# Patient Record
Sex: FEMALE | NOT HISPANIC OR LATINO | Employment: FULL TIME | URBAN - METROPOLITAN AREA
[De-identification: names, ages, dates, MRNs, and addresses within clinical notes are randomized per-mention and may not be internally consistent; named-entity substitution may affect disease eponyms.]

---

## 2017-12-01 ENCOUNTER — TRANSFERRED RECORDS (OUTPATIENT)
Dept: HEALTH INFORMATION MANAGEMENT | Facility: CLINIC | Age: 27
End: 2017-12-01

## 2017-12-01 LAB — PAP SMEAR - HIM PATIENT REPORTED: NEGATIVE

## 2018-08-24 ENCOUNTER — OFFICE VISIT (OUTPATIENT)
Dept: FAMILY MEDICINE | Facility: CLINIC | Age: 28
End: 2018-08-24
Payer: COMMERCIAL

## 2018-08-24 VITALS
HEIGHT: 67 IN | TEMPERATURE: 98.1 F | DIASTOLIC BLOOD PRESSURE: 84 MMHG | SYSTOLIC BLOOD PRESSURE: 126 MMHG | RESPIRATION RATE: 18 BRPM | HEART RATE: 79 BPM | BODY MASS INDEX: 44.89 KG/M2 | OXYGEN SATURATION: 99 % | WEIGHT: 286 LBS

## 2018-08-24 DIAGNOSIS — J06.9 VIRAL UPPER RESPIRATORY TRACT INFECTION: Primary | ICD-10-CM

## 2018-08-24 DIAGNOSIS — E66.01 MORBID OBESITY (H): ICD-10-CM

## 2018-08-24 DIAGNOSIS — J98.01 ACUTE BRONCHOSPASM: ICD-10-CM

## 2018-08-24 PROCEDURE — 99203 OFFICE O/P NEW LOW 30 MIN: CPT | Performed by: FAMILY MEDICINE

## 2018-08-24 RX ORDER — PREDNISONE 20 MG/1
40 TABLET ORAL DAILY
Qty: 10 TABLET | Refills: 0 | Status: SHIPPED | OUTPATIENT
Start: 2018-08-24 | End: 2018-08-28

## 2018-08-24 RX ORDER — ALBUTEROL SULFATE 90 UG/1
2 AEROSOL, METERED RESPIRATORY (INHALATION) EVERY 6 HOURS PRN
Qty: 1 INHALER | Refills: 0 | Status: SHIPPED | OUTPATIENT
Start: 2018-08-24 | End: 2024-01-18

## 2018-08-24 NOTE — PROGRESS NOTES
"  SUBJECTIVE:   Bettina Blackman is a 28 year old female who presents to clinic today for the following health issues:        RESPIRATORY SYMPTOMS      Duration: X7 days,    Description  cough, wheezing and chest tightness    Severity: moderate    Accompanying signs and symptoms: None    History (predisposing factors):  none    Precipitating or alleviating factors: None    Therapies tried and outcome:  rest and fluids guaifenesin    No fevers.  No allergies.  No history of tobacco abuse or asthma.   [place]  No sick exposure.     Problem list and histories reviewed & adjusted, as indicated.  Additional history: as documented    Labs reviewed in EPIC    Reviewed and updated as needed this visit by clinical staff  Tobacco  Allergies  Meds  Problems  Med Hx  Surg Hx  Fam Hx  Soc Hx        Reviewed and updated as needed this visit by Provider  Allergies  Meds  Problems         ROS:  C: NEGATIVE for fever, chills, change in weight  I: NEGATIVE for worrisome rashes, moles or lesions  E: NEGATIVE for vision changes or irritation  CV: NEGATIVE for chest pain, palpitations or peripheral edema  GI: NEGATIVE for nausea, abdominal pain, heartburn, or change in bowel habits  M: NEGATIVE for significant arthralgias or myalgia  H: NEGATIVE for bleeding problems      OBJECTIVE:     /84 (BP Location: Left arm, Patient Position: Sitting, Cuff Size: Adult Large)  Pulse 79  Temp 98.1  F (36.7  C) (Tympanic)  Resp 18  Ht 5' 7\" (1.702 m)  Wt 286 lb (129.7 kg)  LMP 07/18/2018  SpO2 99%  Breastfeeding? No  BMI 44.79 kg/m2  Body mass index is 44.79 kg/(m^2).   GENERAL: alert and coughing/wheezing intermittently   EYES: Eyes grossly normal to inspection, PERRL and conjunctivae and sclerae normal  HENT: ear canals and TM's normal, mouth without ulcers or lesions.  +b/l boggy turbinates, no active nasal drainage.  NECK: no adenopathy, no asymmetry, masses, or scars and thyroid " normal to palpation  RESP: No rales on auscultation.  +rhonchi and audible /b/l expiratory wheezes  CV: regular rate and rhythm, normal S1 S2, no S3 or S4, no murmur, click or rub, no peripheral edema and peripheral pulses strong  SKIN: no suspicious lesions or rashes      Diagnostic Test Results:  none     ASSESSMENT/PLAN:     Problem List Items Addressed This Visit     Morbid obesity (H)    Relevant Medications    metFORMIN (GLUCOPHAGE) 500 MG tablet      Other Visit Diagnoses     Viral upper respiratory tract infection    -  Primary    Relevant Medications    albuterol (PROAIR HFA/PROVENTIL HFA/VENTOLIN HFA) 108 (90 Base) MCG/ACT inhaler    predniSONE (DELTASONE) 20 MG tablet    Acute bronchospasm             --push fluids, rest, and symptomatic treatment as needed:  Mucinex 600 mg 2 tabs bid  Increase humidity to 30-40% in bedroom at night - vaporizer  Saline nasal spray as needed  Benadryl 25mg 1/2 - 1 hour before bed time  Maintain 8 hr minimum of sleep at night  Robitussin for cough  --Will return to clinic in 1 week or sooner if needed.  See Patient Instructions for details and follow-up instructions  --Will work on diet/exercise and weight loss when feeling better. Admits that she is currently taking Metformin but may re-start it later.     Frances Cronin, DO  [facility]

## 2018-08-24 NOTE — PATIENT INSTRUCTIONS
Proair HFA Inhaler  Medicine: Albuterol (al-BYOO-ter-ahl)  What it does  Works quickly to relax your airways and make breathing easier. The medicine usually lasts 3 to 4 hours. Use when you need fast relief.  Test spray (priming)  Prime inhaler before first use or if not used for 2 weeks or more. Shake the inhaler and spray 3 times away from face.  How to use this inhaler  1. Wash and dry your hands well.  2. Remove the mouthpiece cover. Check for loose parts inside the mouthpiece.  3. Sit up straight or stand up.  4. Shake inhaler well before each spray.  5. Fully exhale (breathe out) through mouth. Hold the inhaler so the mouthpiece is at the bottom and the canister is sticking straight up.  6. Place the mouthpiece between your lips. Make sure that your tongue is flat under the mouthpiece and does not block the opening.  7. Seal your lips around the mouthpiece.  8. Push the canister all the way down as you slowly take a deep breath through your mouth for 5 seconds.    9. Hold your breath for 10 seconds. If you cannot hold your breath for 10 seconds, then hold it for as long as you can.  10. If you need another dose, wait one minute and repeat steps 4 to 9.  11. Replace the cover after each use. Store in a cool, dry place.  Cleaning  Wash one time each week. Remove canister and wash mouthpiece with warm water. Do not get the canister wet. Let air-dry overnight. Put inhaler back together and spray two times.  If not cleaned, the inhaler may not work.  How to tell if the inhaler is empty  Each inhaler contains 200 puffs. The inhaler is empty when the dose counter reads 0. The numbers will turn red when you have 20 doses left to remind you to get a refill.  If you have questions about the use of your inhaler, please ask your pharmacist or provider.  For more information and video demos, go to [email or web address].  For informational purposes only. Not to replace the advice of your health care provider.  Copyright    2013 [facility]. All rights reserved. [employer] 975410   REV 02/16.    Viral or Bacterial Bronchitis with Wheezing (Adult)    Bronchitis is an infection of the air passages. It often occurs during a cold and is usually caused by a virus. Symptoms include cough with mucus (phlegm) and low-grade fever. This illness is contagious during the first few days and is spread through the air by coughing and sneezing, or by direct contact (touching the sick person and then touching your own eyes, nose, or mouth).  If there is a lot of inflammation, air flow is restricted. The air passages may also go into spasm, especially if you have asthma. This causes wheezing and difficulty breathing even in people who do not have asthma.  Bronchitis usually lasts 7 to 14 days. The wheezing should improve with treatment during the first week. An inhaler is often prescribed to relax the air passages and stop wheezing. Antibiotics will be prescribed if your doctor thinks there is also a secondary bacterial infection.  Home care    If symptoms are severe, rest at home for the first 2 to 3 days. When you go back to your usual activities, don't let yourself get too tired.    Do not smoke. Also avoid being exposed to secondhand smoke.    You may use over-the-counter medicine to control fever or pain, unless another medicine was prescribed. Note: If you have chronic liver or kidney disease or have ever had a stomach ulcer or gastrointestinal bleeding, talk with your healthcare provider before using these medicines. Also talk to your provider if you are taking medicine to prevent blood clots.) Aspirin should never be given to anyone younger than 18 years of age who is ill with a viral infection or fever. It may cause severe liver or brain damage.    Your appetite may be poor, so a light diet is fine. Avoid dehydration by drinking 6 to 8 glasses of fluids per day (such as water, soft drinks, sports drinks, juices, tea, or soup).  Extra fluids will help loosen secretions in the nose and lungs.    Over-the-counter cough, cold, and sore-throat medicines will not shorten the length of the illness, but they may be helpful to reduce symptoms. (Note: Do not use decongestants if you have high blood pressure.)    If you were given an inhaler, use it exactly as directed. If you need to use it more often than prescribed, your condition may be worsening. If this happens, contact your healthcare provider.    If prescribed, finish all antibiotic medicine, even if you are feeling better after only a few days.  Follow-up care  Follow up with your healthcare provider, or as advised. If you had an X-ray or ECG (electrocardiogram), a specialist will review it. You will be notified of any new findings that may affect your care.  If you are age 65 or older, or if you have a chronic lung disease or condition that affects your immune system, or you smoke, ask your healthcare provider about getting a pneumococcal vaccine and a yearly flu shot (influenza vaccine).  When to seek medical advice  Call your healthcare provider right away if any of these occur:    Fever of 100.4 F (38 C) or higher, or as directed by your healthcare provider    Coughing up increasing amounts of colored sputum    Weakness, drowsiness, headache, facial pain, ear pain, or a stiff neck  Call 911  Call 911 if any of these occur.    Coughing up blood    Worsening weakness, drowsiness, headache, or stiff neck    Increased wheezing not helped with medication, shortness of breath, or pain with breathing  Date Last Reviewed: 9/13/2015 2000-2017 [employer]. [address] [place] [address], [place] [address]. All rights reserved. This information is not intended as a substitute for professional medical care. Always follow your healthcare professional's instructions.

## 2018-08-24 NOTE — Clinical Note
Please abstract the following data from this visit with this patient into the appropriate field in Epic:  Pap smear done on this date: 12/2017 (approximately), by this group: [facility], results were NEGATIVE. Next Pap due in 3 years

## 2018-08-24 NOTE — MR AVS SNAPSHOT
After Visit Summary   8/24/2018    Bettina Blackman    MRN: [id number]           Patient Information     Date Of Birth          1990        Visit Information        Provider Department      8/24/2018 3:10 PM Frances Cronin DO [facility]        Today's Diagnoses     Viral upper respiratory tract infection    -  1      Care Instructions    Proair HFA Inhaler  Medicine: Albuterol (al-BYOO-ter-ahl)  What it does  Works quickly to relax your airways and make breathing easier. The medicine usually lasts 3 to 4 hours. Use when you need fast relief.  Test spray (priming)  Prime inhaler before first use or if not used for 2 weeks or more. Shake the inhaler and spray 3 times away from face.  How to use this inhaler  1. Wash and dry your hands well.  2. Remove the mouthpiece cover. Check for loose parts inside the mouthpiece.  3. Sit up straight or stand up.  4. Shake inhaler well before each spray.  5. Fully exhale (breathe out) through mouth. Hold the inhaler so the mouthpiece is at the bottom and the canister is sticking straight up.  6. Place the mouthpiece between your lips. Make sure that your tongue is flat under the mouthpiece and does not block the opening.  7. Seal your lips around the mouthpiece.  8. Push the canister all the way down as you slowly take a deep breath through your mouth for 5 seconds.    9. Hold your breath for 10 seconds. If you cannot hold your breath for 10 seconds, then hold it for as long as you can.  10. If you need another dose, wait one minute and repeat steps 4 to 9.  11. Replace the cover after each use. Store in a cool, dry place.  Cleaning  Wash one time each week. Remove canister and wash mouthpiece with warm water. Do not get the canister wet. Let air-dry overnight. Put inhaler back together and spray two times.  If not cleaned, the inhaler may not work.  How to tell if the inhaler is empty  Each inhaler contains 200 puffs.  The inhaler is empty when the dose counter reads 0. The numbers will turn red when you have 20 doses left to remind you to get a refill.  If you have questions about the use of your inhaler, please ask your pharmacist or provider.  For more information and video demos, go to [email or web address].  For informational purposes only. Not to replace the advice of your health care provider.  Copyright   2013 [facility]. All rights reserved. [employer] [id number] - REV 02/16.    Viral or Bacterial Bronchitis with Wheezing (Adult)    Bronchitis is an infection of the air passages. It often occurs during a cold and is usually caused by a virus. Symptoms include cough with mucus (phlegm) and low-grade fever. This illness is contagious during the first few days and is spread through the air by coughing and sneezing, or by direct contact (touching the sick person and then touching your own eyes, nose, or mouth).  If there is a lot of inflammation, air flow is restricted. The air passages may also go into spasm, especially if you have asthma. This causes wheezing and difficulty breathing even in people who do not have asthma.  Bronchitis usually lasts 7 to 14 days. The wheezing should improve with treatment during the first week. An inhaler is often prescribed to relax the air passages and stop wheezing. Antibiotics will be prescribed if your doctor thinks there is also a secondary bacterial infection.  Home care    If symptoms are severe, rest at home for the first 2 to 3 days. When you go back to your usual activities, don't let yourself get too tired.    Do not smoke. Also avoid being exposed to secondhand smoke.    You may use over-the-counter medicine to control fever or pain, unless another medicine was prescribed. Note: If you have chronic liver or kidney disease or have ever had a stomach ulcer or gastrointestinal bleeding, talk with your healthcare provider before using these medicines. Also talk to  your provider if you are taking medicine to prevent blood clots.) Aspirin should never be given to anyone younger than 18 years of age who is ill with a viral infection or fever. It may cause severe liver or brain damage.    Your appetite may be poor, so a light diet is fine. Avoid dehydration by drinking 6 to 8 glasses of fluids per day (such as water, soft drinks, sports drinks, juices, tea, or soup). Extra fluids will help loosen secretions in the nose and lungs.    Over-the-counter cough, cold, and sore-throat medicines will not shorten the length of the illness, but they may be helpful to reduce symptoms. (Note: Do not use decongestants if you have high blood pressure.)    If you were given an inhaler, use it exactly as directed. If you need to use it more often than prescribed, your condition may be worsening. If this happens, contact your healthcare provider.    If prescribed, finish all antibiotic medicine, even if you are feeling better after only a few days.  Follow-up care  Follow up with your healthcare provider, or as advised. If you had an X-ray or ECG (electrocardiogram), a specialist will review it. You will be notified of any new findings that may affect your care.  If you are age 65 or older, or if you have a chronic lung disease or condition that affects your immune system, or you smoke, ask your healthcare provider about getting a pneumococcal vaccine and a yearly flu shot (influenza vaccine).  When to seek medical advice  Call your healthcare provider right away if any of these occur:    Fever of 100.4 F (38 C) or higher, or as directed by your healthcare provider    Coughing up increasing amounts of colored sputum    Weakness, drowsiness, headache, facial pain, ear pain, or a stiff neck  Call 911  Call 911 if any of these occur.    Coughing up blood    Worsening weakness, drowsiness, headache, or stiff neck    Increased wheezing not helped with medication, shortness of breath, or pain with  "breathing  Date Last Reviewed: 9/13/2015 2000-2017 [employer]. [address] [place] [address], [place] [address]. All rights reserved. This information is not intended as a substitute for professional medical care. Always follow your healthcare professional's instructions.                Follow-ups after your visit        Follow-up notes from your care team     Return in about 1 week (around 8/31/2018) for URI.      Who to contact     If you have questions or need follow up information about today's clinic visit or your schedule please contact [facility] directly at [phone].  Normal or non-critical lab and imaging results will be communicated to you by MyChart, letter or phone within 4 business days after the clinic has received the results. If you do not hear from us within 7 days, please contact the clinic through MyChart or phone. If you have a critical or abnormal lab result, we will notify you by phone as soon as possible.  Submit refill requests through [employer] or call your pharmacy and they will forward the refill request to us. Please allow 3 business days for your refill to be completed.          Additional Information About Your Visit        Care EveryWhere ID     This is your Care EveryWhere ID. This could be used by other organizations to access your [facility] medical records  [id number]        Your Vitals Were     Pulse Temperature Respirations Height Last Period Pulse Oximetry    79 98.1  F (36.7  C) (Tympanic) 18 5' 7\" (1.702 m) 07/18/2018 99%    Breastfeeding? BMI (Body Mass Index)                No 44.79 kg/m2           Blood Pressure from Last 3 Encounters:   08/24/18 126/84   08/15/14 112/62   04/01/14 100/58    Weight from Last 3 Encounters:   08/24/18 286 lb (129.7 kg)   04/01/14 267 lb 8 oz (121.3 kg)   01/25/14 271 lb (122.9 kg)              Today, you had the following     No orders found for display         Today's Medication Changes        "   These changes are accurate as of 8/24/18  3:33 PM.  If you have any questions, ask your nurse or doctor.               Start taking these medicines.        Dose/Directions    albuterol 108 (90 Base) MCG/ACT inhaler   Commonly known as:  PROAIR HFA/PROVENTIL HFA/VENTOLIN HFA   Used for:  Viral upper respiratory tract infection   Started by:  Frances Cronin DO        Dose:  2 puff   Inhale 2 puffs into the lungs every 6 hours as needed for shortness of breath / dyspnea or wheezing   Quantity:  1 Inhaler   Refills:  0       predniSONE 20 MG tablet   Commonly known as:  DELTASONE   Used for:  Viral upper respiratory tract infection   Started by:  Frances Cronin DO        Dose:  40 mg   Take 2 tablets (40 mg) by mouth daily for 5 days   Quantity:  10 tablet   Refills:  0            Where to get your medicines      These medications were sent to [facility] [address] [place] [address] - [address] AT [facility] [place]  [address], [facility] [address]     Phone:  [phone]     albuterol 108 (90 Base) MCG/ACT inhaler    predniSONE 20 MG tablet                Primary Care Provider    Sulma Ceja MD       No address on file        Equal Access to Services     RENETTA ZARATE : Hadii bridget ku hadasho Soomaali, waaxda luqadaha, qaybta kaalmada adeegyada, waxay florencia medley. So [facility] [phone].    ATENCIÓN: Si habla español, tiene a alvarez disposición servicios gratuitos de asistencia lingüística. Llame al [phone].    We comply with applicable federal civil rights laws and [place] laws. We do not discriminate on the basis of race, color, national origin, age, disability, sex, sexual orientation, or gender identity.            Thank you!     Thank you for choosing [facility]  for your care. Our goal is always to provide you with excellent care. Hearing back from our patients is one way we can continue  to improve our services. Please take a few minutes to complete the written survey that you may receive in the mail after your visit with us. Thank you!             Your Updated Medication List - Protect others around you: Learn how to safely use, store and throw away your medicines at www.disposemymeds.org.          This list is accurate as of 8/24/18  3:33 PM.  Always use your most recent med list.                   Brand Name Dispense Instructions for use Diagnosis    albuterol 108 (90 Base) MCG/ACT inhaler    PROAIR HFA/PROVENTIL HFA/VENTOLIN HFA    1 Inhaler    Inhale 2 puffs into the lungs every 6 hours as needed for shortness of breath / dyspnea or wheezing    Viral upper respiratory tract infection       FINACEA 15 % gel   Generic drug:  Azelaic Acid      Apply 0.5 inches topically.        metFORMIN 500 MG tablet    GLUCOPHAGE          predniSONE 20 MG tablet    DELTASONE    10 tablet    Take 2 tablets (40 mg) by mouth daily for 5 days    Viral upper respiratory tract infection

## 2018-08-28 ENCOUNTER — OFFICE VISIT (OUTPATIENT)
Dept: FAMILY MEDICINE | Facility: CLINIC | Age: 28
End: 2018-08-28
Payer: COMMERCIAL

## 2018-08-28 VITALS
SYSTOLIC BLOOD PRESSURE: 118 MMHG | TEMPERATURE: 98.2 F | WEIGHT: 285 LBS | BODY MASS INDEX: 44.64 KG/M2 | RESPIRATION RATE: 18 BRPM | DIASTOLIC BLOOD PRESSURE: 72 MMHG | HEART RATE: 87 BPM | OXYGEN SATURATION: 100 %

## 2018-08-28 DIAGNOSIS — J01.90 ACUTE SINUSITIS WITH SYMPTOMS > 10 DAYS: ICD-10-CM

## 2018-08-28 DIAGNOSIS — R06.2 WHEEZING: ICD-10-CM

## 2018-08-28 DIAGNOSIS — J06.9 VIRAL UPPER RESPIRATORY TRACT INFECTION: Primary | ICD-10-CM

## 2018-08-28 PROCEDURE — 99213 OFFICE O/P EST LOW 20 MIN: CPT | Performed by: FAMILY MEDICINE

## 2018-08-28 RX ORDER — PREDNISONE 20 MG/1
TABLET ORAL
Qty: 20 TABLET | Refills: 0 | Status: SHIPPED | OUTPATIENT
Start: 2018-08-28 | End: 2019-09-19

## 2018-08-28 RX ORDER — AMOXICILLIN 500 MG/1
1000 CAPSULE ORAL 3 TIMES DAILY
Qty: 60 CAPSULE | Refills: 0 | Status: SHIPPED | OUTPATIENT
Start: 2018-08-28 | End: 2018-09-07

## 2018-08-28 NOTE — MR AVS SNAPSHOT
After Visit Summary   8/28/2018    Bettina Blackman    MRN: [id number]           Patient Information     Date Of Birth          1990        Visit Information        Provider Department      8/28/2018 7:15 AM Rob Bolden MD [facility]        Today's Diagnoses     Viral upper respiratory tract infection    -  1    Wheezing        Acute sinusitis with symptoms > 10 days          Care Instructions    Will cover with amoxicillin and prednisone. See back in 2 weeks if not improving.          Follow-ups after your visit        Follow-up notes from your care team     Return in about 2 weeks (around 9/11/2018), or if symptoms worsen or fail to improve, for Routine Visit.      Who to contact     If you have questions or need follow up information about today's clinic visit or your schedule please contact [facility] directly at [phone].  Normal or non-critical lab and imaging results will be communicated to you by MyChart, letter or phone within 4 business days after the clinic has received the results. If you do not hear from us within 7 days, please contact the clinic through MyChart or phone. If you have a critical or abnormal lab result, we will notify you by phone as soon as possible.  Submit refill requests through [employer] or call your pharmacy and they will forward the refill request to us. Please allow 3 business days for your refill to be completed.          Additional Information About Your Visit        Care EveryWhere ID     This is your Care EveryWhere ID. This could be used by other organizations to access your [facility] medical records  [id number]        Your Vitals Were     Pulse Temperature Respirations Pulse Oximetry BMI (Body Mass Index)       87 98.2  F (36.8  C) (Tympanic) 18 100% 44.64 kg/m2        Blood Pressure from Last 3 Encounters:   08/28/18 118/72   08/24/18 126/84   08/15/14 112/62    Weight from  Last 3 Encounters:   08/28/18 285 lb (129.3 kg)   08/24/18 286 lb (129.7 kg)   04/01/14 267 lb 8 oz (121.3 kg)              Today, you had the following     No orders found for display         Today's Medication Changes          These changes are accurate as of 8/28/18  7:42 AM.  If you have any questions, ask your nurse or doctor.               Start taking these medicines.        Dose/Directions    amoxicillin 500 MG capsule   Commonly known as:  AMOXIL   Used for:  Acute sinusitis with symptoms > 10 days   Started by:  Rob Bolden MD        Dose:  1000 mg   Take 2 capsules (1,000 mg) by mouth 3 times daily for 10 days   Quantity:  60 capsule   Refills:  0       predniSONE 20 MG tablet   Commonly known as:  DELTASONE   Used for:  Wheezing, Viral upper respiratory tract infection   Started by:  Rob Bolden MD        Take 3 tabs (60 mg) by mouth daily x 3 days, 2 tabs (40 mg) daily x 3 days, 1 tab (20 mg) daily x 3 days, then 1/2 tab (10 mg) x 3 days.   Quantity:  20 tablet   Refills:  0            Where to get your medicines      These medications were sent to [facility] [address] [place] [address] [address] AT [facility] [place]  [address], [facility] [address]     Phone:  [phone]     amoxicillin 500 MG capsule    predniSONE 20 MG tablet                Primary Care Provider    Sulma Ceja MD       No address on file        Equal Access to Services     COREY ZARATE AH: Hadii bridget groveo Sosheeba, waaxda luqadaha, qaybta kaalmada adeegyada, waxay florencia medley. So [facility] [phone].    ATENCIÓN: Si habla español, tiene a alvarez disposición servicios gratuitos de asistencia lingüística. Llame al [phone].    We comply with applicable federal civil rights laws and [place] laws. We do not discriminate on the basis of race, color, national origin, age, disability, sex, sexual orientation, or gender  identity.            Thank you!     Thank you for choosing [facility]  for your care. Our goal is always to provide you with excellent care. Hearing back from our patients is one way we can continue to improve our services. Please take a few minutes to complete the written survey that you may receive in the mail after your visit with us. Thank you!             Your Updated Medication List - Protect others around you: Learn how to safely use, store and throw away your medicines at www.disposemymeds.org.          This list is accurate as of 8/28/18  7:42 AM.  Always use your most recent med list.                   Brand Name Dispense Instructions for use Diagnosis    albuterol 108 (90 Base) MCG/ACT inhaler    PROAIR HFA/PROVENTIL HFA/VENTOLIN HFA    1 Inhaler    Inhale 2 puffs into the lungs every 6 hours as needed for shortness of breath / dyspnea or wheezing    Viral upper respiratory tract infection       amoxicillin 500 MG capsule    AMOXIL    60 capsule    Take 2 capsules (1,000 mg) by mouth 3 times daily for 10 days    Acute sinusitis with symptoms > 10 days       FINACEA 15 % gel   Generic drug:  Azelaic Acid      Apply 0.5 inches topically.        metFORMIN 500 MG tablet    GLUCOPHAGE          predniSONE 20 MG tablet    DELTASONE    20 tablet    Take 3 tabs (60 mg) by mouth daily x 3 days, 2 tabs (40 mg) daily x 3 days, 1 tab (20 mg) daily x 3 days, then 1/2 tab (10 mg) x 3 days.    Wheezing, Viral upper respiratory tract infection

## 2018-08-28 NOTE — PROGRESS NOTES
SUBJECTIVE:   Bettina Blackman is a 28 year old female who presents to clinic today for the following health issues:      ENT Symptoms             Symptoms: cc Present Absent Comment   Fever/Chills  x  Sweats, not actual fever   Fatigue  x     Muscle Aches  x     Eye Irritation   x    Sneezing   x    Nasal Hakan/Drg  x     Sinus Pressure/Pain   x    Loss of smell   x    Dental pain   x    Sore Throat   x    Swollen Glands   x    Ear Pain/Fullness   x    Cough  x     Wheeze  x     Chest Pain  x     Shortness of breath  x     Rash   x    Other   x      Symptom duration:  1-2 weeks   Symptom severity:  moderate   Treatments tried:  cough syrup-nyquil and dayquil, sudafed, mucinex, tyneol/ibuprofen, prednisone and inhaler   Contacts:  none             Problem list and histories reviewed & adjusted, as indicated.  Additional history: as documented    Patient Active Problem List   Diagnosis     Acne     Vitamin D deficiency     Cutaneous skin tags     Morbid obesity (H)     History reviewed. No pertinent surgical history.    Social History   Substance Use Topics     Smoking status: Never Smoker     Smokeless tobacco: Never Used     Alcohol use Yes     Family History   Problem Relation Age of Onset     Diabetes Father            Reviewed and updated as needed this visit by clinical staff  Tobacco  Allergies  Meds  Med Hx  Surg Hx  Fam Hx  Soc Hx      Reviewed and updated as needed this visit by Provider         ROS:  Constitutional, HEENT, cardiovascular, pulmonary, gi and gu systems are negative, except as otherwise noted.    OBJECTIVE:                                                    /72  Pulse 87  Temp 98.2  F (36.8  C) (Tympanic)  Resp 18  Wt 285 lb (129.3 kg)  SpO2 100%  BMI 44.64 kg/m2  Body mass index is 44.64 kg/(m^2).  GENERAL APPEARANCE: healthy, alert and no distress  EYES: Eyes grossly normal to inspection, PERRL and conjunctivae and sclerae normal  HENT: ear canals and TM's normal and  nose and mouth without ulcers or lesions  RESP: expiratory wheezes throughout  LYMPHATICS: no cervical adenopathy         ASSESSMENT/PLAN:                                                        ICD-10-CM    1. Viral upper respiratory tract infection J06.9 predniSONE (DELTASONE) 20 MG tablet    B97.89    2. Wheezing R06.2 predniSONE (DELTASONE) 20 MG tablet   3. Acute sinusitis with symptoms > 10 days J01.90 amoxicillin (AMOXIL) 500 MG capsule       Patient Instructions   Will cover with amoxicillin and prednisone. See back in 2 weeks if not improving.      Rob Bolden MD  [facility]

## 2019-09-19 ENCOUNTER — OFFICE VISIT (OUTPATIENT)
Dept: FAMILY MEDICINE | Facility: CLINIC | Age: 29
End: 2019-09-19
Payer: COMMERCIAL

## 2019-09-19 VITALS — HEART RATE: 75 BPM | DIASTOLIC BLOOD PRESSURE: 88 MMHG | SYSTOLIC BLOOD PRESSURE: 126 MMHG | TEMPERATURE: 98.1 F

## 2019-09-19 DIAGNOSIS — Z71.84 TRAVEL ADVICE ENCOUNTER: Primary | ICD-10-CM

## 2019-09-19 PROCEDURE — 36415 COLL VENOUS BLD VENIPUNCTURE: CPT | Mod: GA | Performed by: NURSE PRACTITIONER

## 2019-09-19 PROCEDURE — 99402 PREV MED CNSL INDIV APPRX 30: CPT | Mod: 25 | Performed by: NURSE PRACTITIONER

## 2019-09-19 PROCEDURE — 90472 IMMUNIZATION ADMIN EACH ADD: CPT | Mod: GA | Performed by: NURSE PRACTITIONER

## 2019-09-19 PROCEDURE — 86787 VARICELLA-ZOSTER ANTIBODY: CPT | Mod: GA | Performed by: NURSE PRACTITIONER

## 2019-09-19 PROCEDURE — 90691 TYPHOID VACCINE IM: CPT | Mod: GA | Performed by: NURSE PRACTITIONER

## 2019-09-19 PROCEDURE — 90715 TDAP VACCINE 7 YRS/> IM: CPT | Mod: GA | Performed by: NURSE PRACTITIONER

## 2019-09-19 PROCEDURE — 90717 YELLOW FEVER VACCINE SUBQ: CPT | Mod: GA | Performed by: NURSE PRACTITIONER

## 2019-09-19 PROCEDURE — 90471 IMMUNIZATION ADMIN: CPT | Performed by: NURSE PRACTITIONER

## 2019-09-19 PROCEDURE — 90686 IIV4 VACC NO PRSV 0.5 ML IM: CPT | Performed by: NURSE PRACTITIONER

## 2019-09-19 RX ORDER — ACETAZOLAMIDE 125 MG/1
TABLET ORAL
Qty: 20 TABLET | Refills: 0 | Status: SHIPPED | OUTPATIENT
Start: 2019-09-19 | End: 2024-01-18

## 2019-09-19 RX ORDER — AZITHROMYCIN 500 MG/1
500 TABLET, FILM COATED ORAL DAILY
Qty: 3 TABLET | Refills: 0 | Status: SHIPPED | OUTPATIENT
Start: 2019-09-19 | End: 2019-09-22

## 2019-09-19 NOTE — NURSING NOTE
Chief Complaint   Patient presents with     Travel Clinic   Prior to immunization administration, verified patients identity using patient s name and date of birth. Please see Immunization Activity for additional information.     Screening Questionnaire for Adult Immunization    Are you sick today?   No   Do you have allergies to medications, food, a vaccine component or latex?   No   Have you ever had a serious reaction after receiving a vaccination?   No   Do you have a long-term health problem with heart disease, lung disease, asthma, kidney disease, metabolic disease (e.g. diabetes), anemia, or other blood disorder?   No   Do you have cancer, leukemia, HIV/AIDS, or any other immune system problem?   No   In the past 3 months, have you taken medications that affect  your immune system, such as prednisone, other steroids, or anticancer drugs; drugs for the treatment of rheumatoid arthritis, Crohn s disease, or psoriasis; or have you had radiation treatments?   No   Have you had a seizure, or a brain or other nervous system problem?   No   During the past year, have you received a transfusion of blood or blood     products, or been given immune (gamma) globulin or antiviral drug?   No   For women: Are you pregnant or is there a chance you could become        pregnant during the next month?   No   Have you received any vaccinations in the past 4 weeks?   No     Immunization questionnaire answers were all negative.        Per orders of [place], injection of  Typhoid, TDap, Yellow Fever, Influenza  and  given by Coco Francois CMA. Patient instructed to remain in clinic for 15 minutes afterwards, and to report any adverse reaction to me immediately.       Screening performed by Coco Francois CMA on 9/19/2019 at 5:25 PM.

## 2019-09-19 NOTE — PROGRESS NOTES
Nurse Note      Itinerary:  [place] and [place]      Departure Date: 10/23/2019      Return Date: 11/03/2019      Length of Trip 3 weeks      Reason for Travel: Tourism           Urban or rural: both      Accommodations: Hotel        IMMUNIZATION HISTORY  Have you received any immunizations within the past 4 weeks?  No  Have you ever fainted from having your blood drawn or from an injection?  No  Have you ever had a fever reaction to vaccination?  No  Have you ever had any bad reaction or side effect from any vaccination?  No  Have you ever had hepatitis A or B vaccine?  Yes  Do you live (or work closely) with anyone who has AIDS, an AIDS-like condition, any other immune disorder or who is on chemotherapy for cancer?  No  Do you have a family history of immunodeficiency?  No  Have you received any injection of immune globulin or any blood products during the past 12 months?  No    Patient roomed by [facility] Francois Angélica Blackman is a 29 year old female seen today alone for counsultation for international travel to the stated countries.   Patient will be departing in  1 month(s) and  traveling with friends.      Patient itinerary :  [place] the [facility] and [place] region of [place] and [place] which presents risk for Rabies, food borne illnesses and Typhoid. exposure.      Patient's activities will include sightseeing, hiking and high altitude exposure.    Patient's country of birth is [place]    Special medical concerns: none  Pre-travel questionnaire was completed by patient and reviewed by provider.     Vitals: /88 (BP Location: Left arm, Patient Position: Sitting, Cuff Size: Adult Large)   Pulse 75   Temp 98.1  F (36.7  C) (Oral)   LMP 09/16/2019   BMI= There is no height or weight on file to calculate BMI.    EXAM:  General:  Well-nourished, well-developed in no acute distress.  Appears to be stated age, interacts appropriately and expresses understanding of  information given to patient.    Current Outpatient Medications   Medication Sig Dispense Refill     acetaZOLAMIDE (DIAMOX) 125 MG tablet Take one tab every 12 hours starting 24 hours prior to [place] and continue for 2 days may restart before [place] 20 tablet 0     albuterol (PROAIR HFA/PROVENTIL HFA/VENTOLIN HFA) 108 (90 Base) MCG/ACT inhaler Inhale 2 puffs into the lungs every 6 hours as needed for shortness of breath / dyspnea or wheezing 1 Inhaler 0     Azelaic Acid (FINACEA) 15 % gel Apply 0.5 inches topically.       Patient Active Problem List   Diagnosis     Acne     Vitamin D deficiency     Cutaneous skin tags     Morbid obesity (H)     Not on File      Immunizations discussed include:   Hepatitis A:  Up to date  Hepatitis B: Up to date  Influenza: Ordered/given today, risks, benefits and side effects reviewed  Typhoid: Ordered/given today, risks, benefits and side effects reviewed  Rabies: Declined  reviewed managment of a animal bite or scratch (washing wound, seek medical care within 24 hours for post exposure prophylaxis )  Yellow Fever:Stamaril given today , consent signed  Ivorian Encephalitis: Not indicated  Meningococcus: Not indicated  Tetanus/Diphtheria: Ordered/given today, risks, benefits and side effects reviewed  Measles/Mumps/Rubella: Up to date  Cholera: Not needed  Polio: Up to date  Pneumococcal: Under age of 65  Varicella: Immune by disease history per patient report  Zostavax:  Not indicated  Shingrix: Ordered/given today, risks, benefits and side effects reviewed  HPV: UP to date  TB:  Low risk     Stamaril Informed Consent    The patient was provided with a copy of the IRB-approved consent form and all questions were answered before the patient agreed to participate by signing the informed consent document.   A copy of the form was provided to the patient.    Date: 09/19/2019  Consent Version Date: 5/10/2017   Consent Obtained by:  Sammie Rodriguez CNP (Lori)     HIPAA:  Yes  HIPAA  Authorization Signed Date: 09/19/2019      Inclusion/Exclusion Criteria:    (Similar to Yellow Fever-VAX)      The patient met all of the following inclusion criteria in order to be eligible for the Stamaril vaccination under this EAP (Expanded Access Investigational New Drug Program)           At increased risk for YF, including researchers, laboratory workers, vaccine production staff, and those who are traveling within 30 days to a YF-endemic region or to a country requiring proof of YF vaccination under IHRs (International Health Regulations)?       Yes     Patient is greater than or equal to 9 months of age on the day of vaccination?     Yes     Patient is greater than or equal to 18 years of age and signed and dated the Consent Forms?     Yes     Patient is < 18 years of age and parent(s)/guardian(s) signed and dated the Consent Forms?      Patient is 7 years to < 18 years of age and signed and dated the Assent form?        No Assent is required.  Patient is <7 years of age.     No      No      N/A     The patient did not meet any of the following criteria that would have excluded the patient from receiving the Stamaril vaccination under this EAP              Patient is less than 9 months of age.       No     The patient is breast-feeding and cannot stop nursing for at least 14 days after vaccination.    Note: Yellow Fever vaccine virus may be transmissible via breast milk by nursing mothers who are vaccinated during the final 2 weeks of pregnancy or post-partum.   Following transmission, infants may develop encephalitis.  The minimum time of discontinuation of breastfeeding for 14 days after vaccination is based on the expected clearance of live-attenuated vaccine virus.       No     The patient is immunosuppressed, whether congenital or idiopathic, including for example, leukemia, lymphoma, other malignancies, and patients who are receiving immunosuppressant medications (e.g. Systemic corticosteroids  [greater than the standard dose of topical or inhaled steroids], alkylating drugs, antimetabolites, of other cytotoxic or immunomodulatory drugs) or radiation therapy or organ transplantation.       No     The patient has known hypersensitivity to the active substance or to any of the excipients of Stamaril vaccine or to eggs or chicken proteins.     No     The patient is symptomatic for human immunodeficiency virus (HIV) infection     No     The patient is asymptomatic for HIV infection but accompanied by evidence of severe immune suppression    Note:  Evidence of severe immune suppression includes CD4+ T-cell counts < 200 cubic millimeters (or < 15% total lymphocytes in children aged < 6 years), or as determined by the health care provider.       No     The patient has a history of thymus dysfunction (including myasthenia gravis, thymoma, thymectomy)     No     Moderate or severe febrile illness or acute illness    Note: Participation in the EAP can be reassessed when moderate or severe febrile illness or acute illness has resolved.       No               Altitude Exposure on this trip: Yes   Past tolerance to Altitude: has tolerated to about [phone]    ASSESSMENT/PLAN:    ICD-10-CM    1. Travel advice encounter Z71.89 Varicella Zoster Virus Antibody IgG     azithromycin (ZITHROMAX) 500 MG tablet     acetaZOLAMIDE (DIAMOX) 125 MG tablet     I have reviewed general recommendations for safe travel   including: food/water precautions, insect precautions, safer sex   practices given high prevalence of Zika, HIV and other STDs,   roadway safety. Educational materials and [facility] report provided.    Malaraia prophylaxis recommended: none  Symptomatic treatment for traveler's diarrhea: azithromycin  Altitude illness prevention and treatment: .diqamox        Evacuation insurance advised and resources were provided to patient.    Total visit time 30 minutes  with over 50% of time spent counseling patient as detailed  above.    Sammie Rodriguez CNP

## 2019-09-19 NOTE — PATIENT INSTRUCTIONS
Today September 19, 2019 you received the    Flu Vaccine    Yellow Fever (YF)    Tetanus (Tdap) Vaccine    Typhoid - injectable. This vaccine is valid for two years.   .    These appointments can be made as a NURSE ONLY visit.    **It is very important for the vaccinations to be given on the scheduled day(s), this helps ensure you receive the full effectiveness of the vaccine.**    Please call [facility] with any questions [phone]    Thank you for visiting [facility]

## 2019-09-20 LAB — VZV IGG SER QL IA: 5.1 AI (ref 0–0.8)

## 2019-11-08 ENCOUNTER — HEALTH MAINTENANCE LETTER (OUTPATIENT)
Age: 29
End: 2019-11-08

## 2020-12-06 ENCOUNTER — HEALTH MAINTENANCE LETTER (OUTPATIENT)
Age: 30
End: 2020-12-06

## 2021-02-20 ENCOUNTER — HEALTH MAINTENANCE LETTER (OUTPATIENT)
Age: 31
End: 2021-02-20

## 2021-04-27 ENCOUNTER — HOSPITAL ENCOUNTER (EMERGENCY)
Facility: CLINIC | Age: 31
Discharge: HOME | End: 2021-04-28
Attending: EMERGENCY MEDICINE | Admitting: EMERGENCY MEDICINE
Payer: COMMERCIAL

## 2021-04-27 DIAGNOSIS — R51.9 NONINTRACTABLE HEADACHE, UNSPECIFIED CHRONICITY PATTERN, UNSPECIFIED HEADACHE TYPE: ICD-10-CM

## 2021-04-27 LAB
ANION GAP SERPL CALCULATED.3IONS-SCNC: 2 MMOL/L (ref 3–14)
BASOPHILS # BLD AUTO: 0 10E9/L (ref 0–0.2)
BASOPHILS NFR BLD AUTO: 0.3 %
BUN SERPL-MCNC: 12 MG/DL (ref 7–30)
CALCIUM SERPL-MCNC: 8.6 MG/DL (ref 8.5–10.1)
CHLORIDE SERPL-SCNC: 109 MMOL/L (ref 94–109)
CO2 SERPL-SCNC: 26 MMOL/L (ref 20–32)
CREAT SERPL-MCNC: 0.83 MG/DL (ref 0.52–1.04)
DIFFERENTIAL METHOD BLD: NORMAL
EOSINOPHIL # BLD AUTO: 0.1 10E9/L (ref 0–0.7)
EOSINOPHIL NFR BLD AUTO: 2 %
ERYTHROCYTE [DISTWIDTH] IN BLOOD BY AUTOMATED COUNT: 12.4 % (ref 10–15)
GFR SERPL CREATININE-BSD FRML MDRD: >90 ML/MIN/{1.73_M2}
GLUCOSE SERPL-MCNC: 93 MG/DL (ref 70–99)
HCT VFR BLD AUTO: 38.7 % (ref 35–47)
HGB BLD-MCNC: 12.6 G/DL (ref 11.7–15.7)
IMM GRANULOCYTES # BLD: 0 10E9/L (ref 0–0.4)
IMM GRANULOCYTES NFR BLD: 0.3 %
LYMPHOCYTES # BLD AUTO: 3 10E9/L (ref 0.8–5.3)
LYMPHOCYTES NFR BLD AUTO: 42.8 %
MCH RBC QN AUTO: 28.5 PG (ref 26.5–33)
MCHC RBC AUTO-ENTMCNC: 32.6 G/DL (ref 31.5–36.5)
MCV RBC AUTO: 88 FL (ref 78–100)
MONOCYTES # BLD AUTO: 0.6 10E9/L (ref 0–1.3)
MONOCYTES NFR BLD AUTO: 8.2 %
NEUTROPHILS # BLD AUTO: 3.3 10E9/L (ref 1.6–8.3)
NEUTROPHILS NFR BLD AUTO: 46.4 %
NRBC # BLD AUTO: 0 10*3/UL
NRBC BLD AUTO-RTO: 0 /100
PLATELET # BLD AUTO: 304 10E9/L (ref 150–450)
POTASSIUM SERPL-SCNC: 3.8 MMOL/L (ref 3.4–5.3)
RBC # BLD AUTO: 4.42 10E12/L (ref 3.8–5.2)
SODIUM SERPL-SCNC: 137 MMOL/L (ref 133–144)
WBC # BLD AUTO: 7 10E9/L (ref 4–11)

## 2021-04-27 PROCEDURE — 85025 COMPLETE CBC W/AUTO DIFF WBC: CPT | Performed by: EMERGENCY MEDICINE

## 2021-04-27 PROCEDURE — 99285 EMERGENCY DEPT VISIT HI MDM: CPT | Mod: 25

## 2021-04-27 PROCEDURE — 96374 THER/PROPH/DIAG INJ IV PUSH: CPT

## 2021-04-27 PROCEDURE — 80048 BASIC METABOLIC PNL TOTAL CA: CPT | Performed by: EMERGENCY MEDICINE

## 2021-04-27 PROCEDURE — 250N000011 HC RX IP 250 OP 636: Performed by: EMERGENCY MEDICINE

## 2021-04-27 PROCEDURE — 96375 TX/PRO/DX INJ NEW DRUG ADDON: CPT

## 2021-04-27 RX ORDER — DEXAMETHASONE SODIUM PHOSPHATE 10 MG/ML
10 INJECTION, SOLUTION INTRAMUSCULAR; INTRAVENOUS ONCE
Status: COMPLETED | OUTPATIENT
Start: 2021-04-27 | End: 2021-04-27

## 2021-04-27 RX ORDER — LORAZEPAM 2 MG/ML
2 INJECTION INTRAMUSCULAR ONCE
Status: COMPLETED | OUTPATIENT
Start: 2021-04-27 | End: 2021-04-27

## 2021-04-27 RX ORDER — BUTALBITAL, ASPIRIN, AND CAFFEINE 325; 50; 40 MG/1; MG/1; MG/1
1 CAPSULE ORAL EVERY 4 HOURS PRN
Qty: 12 CAPSULE | Refills: 0 | Status: SHIPPED | OUTPATIENT
Start: 2021-04-27 | End: 2024-01-18

## 2021-04-27 RX ORDER — DIPHENHYDRAMINE HYDROCHLORIDE 50 MG/ML
50 INJECTION INTRAMUSCULAR; INTRAVENOUS ONCE
Status: COMPLETED | OUTPATIENT
Start: 2021-04-27 | End: 2021-04-27

## 2021-04-27 RX ADMIN — DIPHENHYDRAMINE HYDROCHLORIDE 50 MG: 50 INJECTION, SOLUTION INTRAMUSCULAR; INTRAVENOUS at 21:01

## 2021-04-27 RX ADMIN — LORAZEPAM 2 MG: 2 INJECTION INTRAMUSCULAR; INTRAVENOUS at 20:56

## 2021-04-27 RX ADMIN — PROCHLORPERAZINE EDISYLATE 10 MG: 5 INJECTION INTRAMUSCULAR; INTRAVENOUS at 20:58

## 2021-04-27 RX ADMIN — DEXAMETHASONE SODIUM PHOSPHATE 10 MG: 10 INJECTION, SOLUTION INTRAMUSCULAR; INTRAVENOUS at 20:59

## 2021-04-27 ASSESSMENT — MIFFLIN-ST. JEOR: SCORE: 2033.57

## 2021-04-27 ASSESSMENT — ENCOUNTER SYMPTOMS
NAUSEA: 1
NUMBNESS: 0
WEAKNESS: 0
HEADACHES: 1

## 2021-04-28 ENCOUNTER — APPOINTMENT (OUTPATIENT)
Dept: MRI IMAGING | Facility: CLINIC | Age: 31
End: 2021-04-28
Attending: EMERGENCY MEDICINE
Payer: COMMERCIAL

## 2021-04-28 VITALS
DIASTOLIC BLOOD PRESSURE: 80 MMHG | OXYGEN SATURATION: 97 % | RESPIRATION RATE: 20 BRPM | SYSTOLIC BLOOD PRESSURE: 150 MMHG | TEMPERATURE: 98 F | BODY MASS INDEX: 42.44 KG/M2 | HEART RATE: 81 BPM | HEIGHT: 68 IN | WEIGHT: 280 LBS

## 2021-04-28 PROCEDURE — 255N000002 HC RX 255 OP 636: Performed by: EMERGENCY MEDICINE

## 2021-04-28 PROCEDURE — 70546 MR ANGIOGRAPH HEAD W/O&W/DYE: CPT

## 2021-04-28 PROCEDURE — A9585 GADOBUTROL INJECTION: HCPCS | Performed by: EMERGENCY MEDICINE

## 2021-04-28 RX ORDER — GADOBUTROL 604.72 MG/ML
10 INJECTION INTRAVENOUS ONCE
Status: COMPLETED | OUTPATIENT
Start: 2021-04-28 | End: 2021-04-28

## 2021-04-28 RX ADMIN — GADOBUTROL 10 ML: 604.72 INJECTION INTRAVENOUS at 00:32

## 2021-04-28 NOTE — ED TRIAGE NOTES
Pt presents to ED c/o 7/10 Left sided HA that radiates to left eye since Sunday. Pt  Denies Hx of Migraines. Has been trying to manage pain with Tylenol and Advil without relief. Last dose of Tylenol (975 Mg) was at 4 pm today.

## 2021-04-28 NOTE — ED PROVIDER NOTES
"  History     Chief Complaint:  Headache       HPI  Bettina Blackman is a 30 year old female who presents for evaluation of a headache. The patient reports that on Sunday night she gradually developed a headache and since then has been worsening. The headache is found on the left side and radiates to her face. She has associated slight nausea with her headache. She has been taking ibuprofen and Tylenol for her symptoms. She denies having a headache similar to hers today. She denies diplopia, flashing lights, blurred vision, numbness, weakness, fever, fall or injury. She also denies a family history of migraines. Of note, she received the Fred and Fred vaccination on 4/11/21.    Review of Systems   Eyes: Negative for visual disturbance (no diplopia, flawshing lights, blurred vision).   Gastrointestinal: Positive for nausea.   Neurological: Positive for headaches. Negative for weakness and numbness.   All other systems reviewed and are negative.    Allergies:  No Known Allergies    Medications:   Diamox  Albuterol   Finacea     Medical History:   Acne  Vitamin D deficiency  Cutaneous skin tags  Morbid obesity     Family  History:    Father: diabetes    Social History:  The patient was unaccompanied to the ED.  Lives with roommates.   PCP: Sulma Ceja    Physical Exam     Patient Vitals for the past 24 hrs:   BP Temp Temp src Pulse Resp SpO2 Height Weight   04/27/21 2300 -- -- -- -- -- 97 % -- --   04/27/21 2230 -- -- -- -- -- 96 % -- --   04/27/21 2200 -- -- -- -- -- 97 % -- --   04/27/21 2130 -- -- -- -- -- 98 % -- --   04/27/21 2100 -- -- -- -- -- 100 % -- --   04/27/21 1944 -- 98  F (36.7  C) Temporal -- -- -- -- --   04/27/21 1943 (!) 150/80 -- -- 81 20 98 % 1.727 m (5' 8\") 127 kg (280 lb)      Physical Exam  Constitutional: Heavy set south  female. No respiratory distress.   HENT: No signs of trauma. No temporal artery tenderness.   Eyes: EOM are normal. Pupils are equal, round, and " reactive to light. Attempted fundoscopic exam difficulty secondary to patient's inability to focus.   Neck: Normal range of motion. No JVD present. No cervical adenopathy. No bruits.  Cardiovascular: Regular rhythm.  Exam reveals no gallop and no friction rub.    No murmur heard.  Pulmonary/Chest: Bilateral breath sounds normal. No wheezes, rhonchi or rales.  Abdominal: Soft. No tenderness. No rebound or guarding.   Musculoskeletal: No edema. No tenderness.   Lymphadenopathy: No lymphadenopathy.   Neurological: Alert and oriented to person, place, and time. Normal strength. Coordination normal. Fluent speech. No facial asymmetry. Normal sensation.    Skin: Skin is warm and dry. No rash noted. No erythema.     Emergency Department Course   Imaging:  MRV Brain wo & w Contrast: pending  Reading per radiology     Laboratory:  CBC: WBC 7.0, HGB 12.6,   BMP: anion gap 2 (Creatinine 0.83)    Emergency Department Course:  Reviewed:  2023 I reviewed nursing notes, vitals, past medical history and care everywhere    Interventions:  2056 Ativan 2 mg IV  2058 Compazine 10 mg IV  2059 Decadron 10 mg IV  2101 Benadryl 50 mg IV    Disposition:  The patient signed out to Dr. Foster pending MRV.  Impression & Plan   Medical Decision Making:  Bettina Blackman is a 30 year old female who presents with left sided headache present consistently for 2 days. She rarely if ever has headaches. This came on gradual onset, but has not gone away despite over the counter medications. She denies any visual problems, no flashing lights, no smell or taste abnormalities consistent with aura. She denies any focal numbness, weakness, fever, chills, or neck pain. She has had no trauma. She did received the Fred and Fred COVID vaccination approximately 2 and a half weeks ago. Her physical exam is relatively unremarkable. There are no focal neurological symptoms. Unfortunately she was unable to tolerate optic exam. Basic labs were okay  including normal platelet count. She received Compazine, Benadryl, and Decadron and that is helping. However, I did discuss with her the rare possibility after the Fred and Fred shot of clots and she is going to have a MRV scan tonight. Given the fact that the platelet count is normal, this is unlikely and this could be a tension or musculoskeletal headache or even some type of migraine. Patient has been signed out to the night physician Dr. Foster. Pending MRV report. If this negative I have written a prescription for Fiorinal and patient will be discharged home with referral to Dr. Hodgson for follow-up. If positive patient will require admission. I do not think this is meninginitis or acute bleed.     Diagnosis:     ICD-10-CM    1. Nonintractable headache, unspecified chronicity pattern, unspecified headache type  R51.9         Discharge Medications:  New Prescriptions    BUTALBITAL-ASPIRIN-CAFFEINE (FIORINAL) -40 MG CAPSULE    Take 1 capsule by mouth every 4 hours as needed for headaches       Scribe Disclosure:  I, Alyse Maza, am serving as a scribe at 8:23 PM on 4/27/2021 to document services personally performed by Anthony Isaac MD based on my observations and the provider's statements to me.     [facility] Anthony Escobedo MD  04/28/21 0020

## 2021-09-25 ENCOUNTER — HEALTH MAINTENANCE LETTER (OUTPATIENT)
Age: 31
End: 2021-09-25

## 2022-01-15 ENCOUNTER — HEALTH MAINTENANCE LETTER (OUTPATIENT)
Age: 32
End: 2022-01-15

## 2023-04-22 ENCOUNTER — HEALTH MAINTENANCE LETTER (OUTPATIENT)
Age: 33
End: 2023-04-22

## 2024-01-16 ENCOUNTER — NURSE TRIAGE (OUTPATIENT)
Dept: NURSING | Facility: CLINIC | Age: 34
End: 2024-01-16
Payer: COMMERCIAL

## 2024-01-16 NOTE — TELEPHONE ENCOUNTER
"Nurse Triage SBAR    Is this a 2nd Level Triage? YES, LICENSED PRACTITIONER REVIEW IS REQUIRED    Situation:  abdominal pain    Background:  patient is complaining of right-sided lower abdominal pain beginning yesterday.  Patient describes this pain as a constant ache and rates it at 5/10.  Patient states that it hurts worse when she is standing and it causes her to walk hunched over.  Last bowel movement was yesterday, patient denies fever    Assessment:  right-sided lower abdominal pain    Protocol Recommended Disposition:   See HCP Within 4 Hours (Or PCP Triage)    Recommendation:  patient is advised to go to . patient verbalized understanding of care advice    Merna Hernandez RN on 1/16/2024 at 5:09 PM      Does the patient meet one of the following criteria for ADS visit consideration? No      Reason for Disposition   [1] MILD-MODERATE pain AND [2] constant AND [3] present > 2 hours    Additional Information   Negative: Shock suspected (e.g., cold/pale/clammy skin, too weak to stand, low BP, rapid pulse)   Negative: Difficult to awaken or acting confused (e.g., disoriented, slurred speech)   Negative: Passed out (i.e., lost consciousness, collapsed and was not responding)   Negative: Sounds like a life-threatening emergency to the triager   Negative: [1] SEVERE pain (e.g., excruciating) AND [2] present > 1 hour   Negative: [1] SEVERE pain AND [2] age > 60 years   Negative: [1] Vomiting AND [2] contains red blood or black (\"coffee ground\") material  (Exception: Few red streaks in vomit that only happened once.)   Negative: Blood in bowel movements  (Exception: Blood on surface of BM with constipation.)   Negative: Black or tarry bowel movements  (Exception: Chronic-unchanged black-grey BMs AND is taking iron pills or Pepto-Bismol.)   Negative: [1] Vomiting AND [2] contains bile (green color)    Protocols used: Abdominal Pain - Female-A-AH    "

## 2024-01-17 ENCOUNTER — OFFICE VISIT (OUTPATIENT)
Dept: URGENT CARE | Facility: URGENT CARE | Age: 34
End: 2024-01-17
Payer: COMMERCIAL

## 2024-01-17 VITALS
OXYGEN SATURATION: 97 % | SYSTOLIC BLOOD PRESSURE: 145 MMHG | TEMPERATURE: 98.2 F | DIASTOLIC BLOOD PRESSURE: 94 MMHG | HEART RATE: 74 BPM

## 2024-01-17 DIAGNOSIS — K92.1 BLOODY STOOL: ICD-10-CM

## 2024-01-17 DIAGNOSIS — R10.31 RLQ ABDOMINAL PAIN: ICD-10-CM

## 2024-01-17 DIAGNOSIS — R10.2 PELVIC PAIN IN FEMALE: Primary | ICD-10-CM

## 2024-01-17 PROCEDURE — 99202 OFFICE O/P NEW SF 15 MIN: CPT | Performed by: EMERGENCY MEDICINE

## 2024-01-17 NOTE — PROGRESS NOTES
Assessment & Plan     Diagnosis:    ICD-10-CM    1. Pelvic pain in female  R10.2 Referral to Acute and Diagnostic Services (Day of diagnostic / First order acute)      2. RLQ abdominal pain  R10.31 Referral to Acute and Diagnostic Services (Day of diagnostic / First order acute)      3. Bloody stool  K92.1 Referral to Acute and Diagnostic Services (Day of diagnostic / First order acute)            Medical Decision Making:  Patient with history of ovarian cysts presented to urgent care with RLQ abdominal/pelvic pain. The clinical exam today is non-specific. The exact etiology of the abdominal pain is not clear at this time. The differential diagnosis of abdominal pain includes: Ovarian Cys, Appendicitis, Bowel Obstruction, Ulcer, Ischemia, kidney stone, Enteritis/Colitis,  amongst many other etiologies. Given limitations of urgent care evaluation and concern for ovarian vs intraabdominal etiology; I discussed being seen in the ADS tomorrow for further evaluation, imaging, blood work etc. as they are booked today. She has no fever, McBurney point tenderness, nausea or vomiting so while I have a lower suspicion for appendicitis I [facility] and she voices understanding and agreement with this plan.  Referral to Acute and Diagnostic Services    [phone] ([place]) [facility] [address], [address], [place] [address]    Transition to Acute & Diagnostic Services Clinic has been discussed with patient, and she agrees with next level of care.   Patient understands that evaluation/treatment at [facility] typically takes significantly longer than in clinic/urgent care (>2 hours).  The [facility] has been contacted by provider/staff to confirm patient acceptance.         Special issues:      None                     The following provider has assessed this patient for intervention at [facility], and directed the patient for referral: Aren Washburn,  SHAKA Washburn PA-C  [facility]    Subjective     Bettina Blackman is a 33 year old female who presents to clinic today for the following health issues:  Chief Complaint   Patient presents with    Abdominal Pain     Pt reports right lower stomach pain since Monday stating bloody stool this morning.       HPI  Patient with 3-4 days of RLQ abdominal/pelvic pain that has been waxing and waning in severity but overall getting progressively worse. She notes pain with movement and pushing on the area. Has hx of ovarian cysts and is unsure if this is the same pain she has had in the past. She also noticed one stool mixed with maroon colored this morning; has not had a lot of pain defecating, straining etc. No dysuria, hematuria, vaginal bleeding or discharge.  No nausea, vomiting, fevers.  Patient states that she is not sexually active; no concerns for pregnancy or STDs.      Review of Systems    See HPI    Objective      Vitals: BP (!) 145/94   Pulse 74   Temp 98.2  F (36.8  C) (Tympanic)   SpO2 97%   Resp: 16    Patient Vitals for the past 24 hrs:   BP Temp Temp src Pulse SpO2   01/17/24 1440 (!) 145/94 98.2  F (36.8  C) Tympanic 74 97 %       Vital signs reviewed by: Aren Washburn PA-C    Physical Exam   Constitutional: Patient is alert and cooperative. Mild acute distress.  Cardiovascular: Regular rate and rhythm  Pulmonary/Chest: Lungs are clear to auscultation throughout. Effort normal. No respiratory distress. No wheezes, rales or rhonchi.  GI: Abdomen with right suprapubic abdominal tenderness to palpation. No McBurney point tenderness. No CVA tenderness bilaterally.  Neurological: Alert and oriented x3.   Skin: No rash noted on visualized skin.  Psychiatric:The patient has a normal mood and affect.       Aren Washburn PA-C, January 17, 2024

## 2024-01-17 NOTE — LETTER
January 17, 2024      Bettina Blackman  [address]  [facility] [place] [address]        To Whom It May Concern:    Bettina Blackman  was seen on 1/17/2024.  Please excuse her from work until 1/19/2024 due to illness.        Sincerely,        Aren Washburn PA-C

## 2024-01-17 NOTE — PATIENT INSTRUCTIONS
No eating after midnight. Water is fine. Go to ER if worse, high fever, increased bloody stools or other concerns.

## 2024-01-18 ENCOUNTER — OFFICE VISIT (OUTPATIENT)
Dept: PEDIATRICS | Facility: CLINIC | Age: 34
End: 2024-01-18
Payer: COMMERCIAL

## 2024-01-18 ENCOUNTER — TELEPHONE (OUTPATIENT)
Dept: SURGERY | Facility: CLINIC | Age: 34
End: 2024-01-18

## 2024-01-18 ENCOUNTER — ANCILLARY PROCEDURE (OUTPATIENT)
Dept: CT IMAGING | Facility: CLINIC | Age: 34
End: 2024-01-18
Attending: PHYSICIAN ASSISTANT
Payer: COMMERCIAL

## 2024-01-18 VITALS
TEMPERATURE: 96.9 F | OXYGEN SATURATION: 99 % | WEIGHT: 293 LBS | BODY MASS INDEX: 44.55 KG/M2 | HEART RATE: 70 BPM | SYSTOLIC BLOOD PRESSURE: 144 MMHG | DIASTOLIC BLOOD PRESSURE: 90 MMHG

## 2024-01-18 DIAGNOSIS — R10.31 RLQ ABDOMINAL PAIN: ICD-10-CM

## 2024-01-18 DIAGNOSIS — K76.0 HEPATIC STEATOSIS: ICD-10-CM

## 2024-01-18 DIAGNOSIS — R10.31 RLQ ABDOMINAL PAIN: Primary | ICD-10-CM

## 2024-01-18 DIAGNOSIS — N83.201 CYST OF RIGHT OVARY: ICD-10-CM

## 2024-01-18 DIAGNOSIS — K92.1 HEMATOCHEZIA: ICD-10-CM

## 2024-01-18 LAB
ALBUMIN SERPL BCG-MCNC: 4.1 G/DL (ref 3.5–5.2)
ALBUMIN UR-MCNC: NEGATIVE MG/DL
ALP SERPL-CCNC: 64 U/L (ref 40–150)
ALT SERPL W P-5'-P-CCNC: 28 U/L (ref 0–50)
ANION GAP SERPL CALCULATED.3IONS-SCNC: 8 MMOL/L (ref 7–15)
APPEARANCE UR: CLEAR
AST SERPL W P-5'-P-CCNC: 23 U/L (ref 0–45)
BACTERIA #/AREA URNS HPF: ABNORMAL /HPF
BILIRUB SERPL-MCNC: 0.6 MG/DL
BILIRUB UR QL STRIP: NEGATIVE
BUN SERPL-MCNC: 8.9 MG/DL (ref 6–20)
CALCIUM SERPL-MCNC: 9 MG/DL (ref 8.6–10)
CHLORIDE SERPL-SCNC: 108 MMOL/L (ref 98–107)
COLOR UR AUTO: YELLOW
CREAT SERPL-MCNC: 0.84 MG/DL (ref 0.51–0.95)
DEPRECATED HCO3 PLAS-SCNC: 23 MMOL/L (ref 22–29)
EGFRCR SERPLBLD CKD-EPI 2021: >90 ML/MIN/1.73M2
ERYTHROCYTE [DISTWIDTH] IN BLOOD BY AUTOMATED COUNT: 12 % (ref 10–15)
GLUCOSE SERPL-MCNC: 92 MG/DL (ref 70–99)
GLUCOSE UR STRIP-MCNC: NEGATIVE MG/DL
HCG UR QL: NEGATIVE
HCT VFR BLD AUTO: 38.2 % (ref 35–47)
HGB BLD-MCNC: 12.4 G/DL (ref 11.7–15.7)
HGB UR QL STRIP: NEGATIVE
KETONES UR STRIP-MCNC: NEGATIVE MG/DL
LEUKOCYTE ESTERASE UR QL STRIP: ABNORMAL
MCH RBC QN AUTO: 28.4 PG (ref 26.5–33)
MCHC RBC AUTO-ENTMCNC: 32.5 G/DL (ref 31.5–36.5)
MCV RBC AUTO: 87 FL (ref 78–100)
NITRATE UR QL: NEGATIVE
PH UR STRIP: 7.5 [PH] (ref 5–7)
PLATELET # BLD AUTO: 248 10E3/UL (ref 150–450)
POTASSIUM SERPL-SCNC: 4.4 MMOL/L (ref 3.4–5.3)
PROT SERPL-MCNC: 7.2 G/DL (ref 6.4–8.3)
RBC # BLD AUTO: 4.37 10E6/UL (ref 3.8–5.2)
RBC #/AREA URNS AUTO: ABNORMAL /HPF
SODIUM SERPL-SCNC: 139 MMOL/L (ref 135–145)
SP GR UR STRIP: 1.02 (ref 1–1.03)
SQUAMOUS #/AREA URNS AUTO: ABNORMAL /LPF
UROBILINOGEN UR STRIP-ACNC: 0.2 E.U./DL
WBC # BLD AUTO: 5.8 10E3/UL (ref 4–11)
WBC #/AREA URNS AUTO: ABNORMAL /HPF

## 2024-01-18 PROCEDURE — 250N000011 HC RX IP 250 OP 636: Performed by: PHYSICIAN ASSISTANT

## 2024-01-18 PROCEDURE — 99215 OFFICE O/P EST HI 40 MIN: CPT | Performed by: PHYSICIAN ASSISTANT

## 2024-01-18 PROCEDURE — 85027 COMPLETE CBC AUTOMATED: CPT | Performed by: PHYSICIAN ASSISTANT

## 2024-01-18 PROCEDURE — 250N000009 HC RX 250: Performed by: PHYSICIAN ASSISTANT

## 2024-01-18 PROCEDURE — 81001 URINALYSIS AUTO W/SCOPE: CPT | Performed by: PHYSICIAN ASSISTANT

## 2024-01-18 PROCEDURE — 36415 COLL VENOUS BLD VENIPUNCTURE: CPT | Performed by: PHYSICIAN ASSISTANT

## 2024-01-18 PROCEDURE — 81025 URINE PREGNANCY TEST: CPT | Performed by: PHYSICIAN ASSISTANT

## 2024-01-18 PROCEDURE — 74177 CT ABD & PELVIS W/CONTRAST: CPT

## 2024-01-18 PROCEDURE — 80053 COMPREHEN METABOLIC PANEL: CPT | Performed by: PHYSICIAN ASSISTANT

## 2024-01-18 RX ORDER — IOPAMIDOL 755 MG/ML
100 INJECTION, SOLUTION INTRAVASCULAR ONCE
Status: COMPLETED | OUTPATIENT
Start: 2024-01-18 | End: 2024-01-18

## 2024-01-18 RX ADMIN — SODIUM CHLORIDE 40 ML: 9 INJECTION, SOLUTION INTRAVENOUS at 10:18

## 2024-01-18 RX ADMIN — IOPAMIDOL 100 ML: 755 INJECTION, SOLUTION INTRAVENOUS at 10:17

## 2024-01-18 NOTE — PROGRESS NOTES
{PROVIDER CHARTING PREFERENCE:898577}    Lucas Dunbar is a 33 year old, presenting for the following health issues:  No chief complaint on file.  {(!) Visit Details have not yet been documented.  Please enter Visit Details and then use this list to pull in documentation. (Optional):159534}  HPI     Genitourinary - Female  Onset/Duration: since Monday   Description:   Painful urination (Dysuria): No           Frequency: YES  Blood in urine (Hematuria): No  Delay in urine (Hesitency): No  Intensity: 7/10  Progression of Symptoms:  worsening  Accompanying Signs & Symptoms:  Fever/chills: No  Flank pain: No  Nausea and vomiting: No  Vaginal symptoms: none  Abdominal/Pelvic Pain: YES- lower middle abdomen/pelvis  History:   History of frequent UTI s: No  History of kidney stones: No  Sexually Active: No  Possibility of pregnancy: No  Precipitating or alleviating factors: sometimes when pushing on area of pain it helps alleviate pain  Therapies tried and outcome: ibuprofen, helped alleviate pain before sleeping  none          Objective    There were no vitals taken for this visit.  There is no height or weight on file to calculate BMI.  {ROS Picklists (Optional):201933}  Physical Exam   {Exam List (Optional):324828}    {Diagnostic Test Results (Optional):544909}        Signed Electronically by: Paty Rubalcava PA-C  {Email feedback regarding this note to [email or web address]   :[id number]}

## 2024-01-18 NOTE — PROGRESS NOTES
Assessment/Plan:    UPT negative, UA without UTI.   No anemia or leukocytosis, CMP unremarkable.   CT shows the following:  Mild wall thickening in rectum favored to be due to underdistention but cannot exclude proctitis or underlying mass lesion. Due to patient's symptoms, I am concerned about proctitis/IBD and have recommended she follow up with GI for consideration of sigmoidoscopy/colonoscopy. Urgent referral placed. Do not feel pt requires hospitalization as vitals are stable, PO intake is good, pain is manageable, and labs are unremarkable. Discussed going to ED if worsening.    R ovarian cyst; as pt often gets cysts, unclear if this is contributing to her pain. Recommended Tylenol and heating pad PRN. Avoid NSAIDs due to concern for possible IBD.   Hepatomegaly with hepatic steatosis; LFTs normal. Pt does have elevated BMI, this may be contributing. Discussed healthy diet & exercise, follow up with PCP to continue to work on weight loss.    Blood in stool with constipation- may also be related to internal hemorrhoids. Advised increased fiber & fluids, may also add Miralax if needed.     See patient instructions below.    At the end of the encounter, I discussed results, diagnosis, medications. Discussed red flags for immediate return to clinic/ER, as well as indications for follow up if no improvement. Patient understood and agreed to plan. Patient was stable for discharge.      ICD-10-CM    1. RLQ abdominal pain  R10.31 UA Macroscopic with reflex to Microscopic and Culture     HCG qualitative urine     HCG qualitative urine     UA Microscopic with Reflex to Culture     CT Abdomen Pelvis w Contrast     sodium chloride (PF) 0.9% PF flush 3 mL     CBC with platelets     Comprehensive metabolic panel     CBC with platelets     Comprehensive metabolic panel     Adult GI  Referral - Consult Only      2. Hematochezia  K92.1 Adult GI  Referral - Consult Only      3. Hepatic steatosis  K76.0        4. Cyst of right ovary  N83.201             Return in about 1 week (around 1/25/2024) for follow up with GI.    HILARY Valdez, SHAKA  [facility]  -----------------------------------------------------------------------------------------------------------------------------------------------------    HPI:  Bettina Blackman is a 33 year old female who presents for evaluation RLQ pain onset 3 days ago. She has also had hematochezia since yesterday. She notes blood mixed in with the stool as well as bright red blood in the toilet water and when wiping. She has been constipated the last few days with harder stools. She denies rectal pain or known hemorrhoids. She does have PCOS so gets pain from ovarian cysts sometimes, but is not sure if this pain is similar.  She also reports urinary frequency. She denies fever/chills, dysuria, diarrhea, N/V, vaginal discharge/itching. She has taken ibuprofen with mild relief.    Her menses are irregular due to her PCOS, last menses was in November. She is not sexually active.       No past medical history on file.    Vitals:    01/18/24 0917   BP: (!) 144/90   BP Location: Left arm   Patient Position: Sitting   Cuff Size: Adult Large   Pulse: 70   Temp: 96.9  F (36.1  C)   TempSrc: Oral   SpO2: 99%   Weight: 132.9 kg (293 lb)       Physical Exam  Vitals and nursing note reviewed.   Pulmonary:      Effort: Pulmonary effort is normal.   Abdominal:      General: Bowel sounds are normal.      Palpations: Abdomen is soft.      Tenderness: There is abdominal tenderness in the right lower quadrant. There is no right CVA tenderness, left CVA tenderness, guarding or rebound.   Neurological:      Mental Status: She is alert.         Labs/Imaging:  Results for orders placed or performed in visit on 01/18/24 (from the past 24 hour(s))   UA Macroscopic with reflex to Microscopic and Culture    Specimen: Urine, Midstream   Result Value Ref Range    Color  Urine Yellow Colorless, Straw, Light Yellow, Yellow    Appearance Urine Clear Clear    Glucose Urine Negative Negative mg/dL    Bilirubin Urine Negative Negative    Ketones Urine Negative Negative mg/dL    Specific Gravity Urine 1.020 1.003 - 1.035    Blood Urine Negative Negative    pH Urine 7.5 (H) 5.0 - 7.0    Protein Albumin Urine Negative Negative mg/dL    Urobilinogen Urine 0.2 0.2, 1.0 E.U./dL    Nitrite Urine Negative Negative    Leukocyte Esterase Urine Small (A) Negative   HCG qualitative urine   Result Value Ref Range    hCG Urine Qualitative Negative Negative   UA Microscopic with Reflex to Culture   Result Value Ref Range    Bacteria Urine Few (A) None Seen /HPF    RBC Urine 0-2 0-2 /HPF /HPF    WBC Urine 0-5 0-5 /HPF /HPF    Squamous Epithelials Urine Few (A) None Seen /LPF    Narrative    Urine Culture not indicated   CBC with platelets   Result Value Ref Range    WBC Count 5.8 4.0 - 11.0 10e3/uL    RBC Count 4.37 3.80 - 5.20 10e6/uL    Hemoglobin 12.4 11.7 - 15.7 g/dL    Hematocrit 38.2 35.0 - 47.0 %    MCV 87 78 - 100 fL    MCH 28.4 26.5 - 33.0 pg    MCHC 32.5 31.5 - 36.5 g/dL    RDW 12.0 10.0 - 15.0 %    Platelet Count 248 150 - 450 10e3/uL   Comprehensive metabolic panel   Result Value Ref Range    Sodium 139 135 - 145 mmol/L    Potassium 4.4 3.4 - 5.3 mmol/L    Carbon Dioxide (CO2) 23 22 - 29 mmol/L    Anion Gap 8 7 - 15 mmol/L    Urea Nitrogen 8.9 6.0 - 20.0 mg/dL    Creatinine 0.84 0.51 - 0.95 mg/dL    GFR Estimate >90 >60 mL/min/1.73m2    Calcium 9.0 8.6 - 10.0 mg/dL    Chloride 108 (H) 98 - 107 mmol/L    Glucose 92 70 - 99 mg/dL    Alkaline Phosphatase 64 40 - 150 U/L    AST 23 0 - 45 U/L    ALT 28 0 - 50 U/L    Protein Total 7.2 6.4 - 8.3 g/dL    Albumin 4.1 3.5 - 5.2 g/dL    Bilirubin Total 0.6 <=1.2 mg/dL     No results found for this or any previous visit (from the past 24 hour(s)).        Patient Instructions   -Follow up with GI for more testing to evaluate possible inflammation of the  rectum and rectal bleeding.   -Follow up with primary care to discuss fatty liver.  -Recommend Tylenol every 4-6 hours as needed, and heat for ovarian cyst.

## 2024-01-18 NOTE — PATIENT INSTRUCTIONS
-Follow up with GI for more testing to evaluate possible inflammation of the rectum and rectal bleeding.   -Follow up with primary care to discuss fatty liver.  -Recommend Tylenol every 4-6 hours as needed, and heat for ovarian cyst.

## 2024-01-18 NOTE — TELEPHONE ENCOUNTER
Left Voicemail (1st Attempt), sent myc for the patient to call back and schedule the following:    Appointment type: New Patient  Provider: Frances Rosado  Return date: Next available appt per IB from Bri MERINO RN  Specialty phone number: [phone]  Additional appointment(s) needed: n/a  Additonal Notes: Referral from Paty Rubalcava for suspected proctitis/hematochezia    Left CC#

## 2024-01-19 NOTE — TELEPHONE ENCOUNTER
Diagnosis, Referred by & from: Abdominal Pain, Hematochezia   Appt date: 2/5/2024   NOTES STATUS DETAILS   OFFICE NOTE from referring provider Internal [facility]  Cori:  1/18/24 - PCC OV with VAN Mendoza   OFFICE NOTE from other specialist Internal Juanita  Cori:  1/17/24 - UC OV with VAN Brannon   DISCHARGE SUMMARY from hospital N/A    DISCHARGE REPORT from the ER N/A    OPERATIVE REPORT N/A    MEDICATION LIST Internal    LABS N/A    DIAGNOSTIC PROCEDURES N/A    IMAGING (DISC & REPORT)      CT Internal [facility]:  1/18/24 - CT Abd/Pelvis

## 2024-02-05 ENCOUNTER — PRE VISIT (OUTPATIENT)
Dept: SURGERY | Facility: CLINIC | Age: 34
End: 2024-02-05

## 2024-02-28 ENCOUNTER — OFFICE VISIT (OUTPATIENT)
Dept: FAMILY MEDICINE | Facility: CLINIC | Age: 34
End: 2024-02-28
Payer: COMMERCIAL

## 2024-02-28 VITALS
RESPIRATION RATE: 16 BRPM | HEART RATE: 62 BPM | OXYGEN SATURATION: 99 % | DIASTOLIC BLOOD PRESSURE: 85 MMHG | SYSTOLIC BLOOD PRESSURE: 134 MMHG | TEMPERATURE: 97.6 F

## 2024-02-28 DIAGNOSIS — E28.2 PCOS (POLYCYSTIC OVARIAN SYNDROME): ICD-10-CM

## 2024-02-28 DIAGNOSIS — K76.0 HEPATIC STEATOSIS: ICD-10-CM

## 2024-02-28 DIAGNOSIS — E66.01 MORBID OBESITY (H): Primary | ICD-10-CM

## 2024-02-28 PROCEDURE — 99213 OFFICE O/P EST LOW 20 MIN: CPT | Performed by: INTERNAL MEDICINE

## 2024-02-28 ASSESSMENT — PAIN SCALES - GENERAL: PAINLEVEL: NO PAIN (0)

## 2024-02-28 NOTE — PROGRESS NOTES
Assessment & Plan     Morbid obesity (H)  Discussed medical management as well as diet and exercise.   - Adult Comprehensive Weight Management  Referral; Future    PCOS (polycystic ovarian syndrome)  Stable.    Hepatic steatosis  Liver enzymes normal.     MED REC REQUIRED  Post Medication Reconciliation Status:  Discharge medications reconciled, continue medications without change    See Patient Instructions    Subjective   Bettina is a 33 year old, presenting for the following health issues:  Establish Care and ER F/U (1/17/24)    History of Present Illness       Reason for visit:  Establish primary care, weight management    She eats 2-3 servings of fruits and vegetables daily.She consumes 0 sweetened beverage(s) daily.She exercises with enough effort to increase her heart rate 10 to 19 minutes per day.  She exercises with enough effort to increase her heart rate 3 or less days per week.   She is taking medications regularly.     Bettina is here to establish care.   She has PCOS: irregular periods and Hepatic steatosis   Reports she is interested in discussing weight loss options.     Fam hx: neg for B/O/C cancer, father with DM2, maternal grandmother with DM2.  Nonsmoker   ETOH: occasional.  Pap due - declined today, will make an appt for APE        Objective    /85 (BP Location: Right arm, Patient Position: Sitting, Cuff Size: Adult Large)   Pulse 62   Temp 97.6  F (36.4  C) (Temporal)   Resp 16   SpO2 99%   There is no height or weight on file to calculate BMI.  Physical Exam           Signed Electronically by: Ritika Armstrong MD

## 2024-03-15 NOTE — PROGRESS NOTES
"Bettina is a 33 year old who is being evaluated via a billable video visit.      The patient has been notified of following:     \"This video visit will be conducted via a call between you and your physician/provider. We have found that certain health care needs can be provided without the need for an in-person physical exam.  This service lets us provide the care you need with a video conversation.  If a prescription is necessary we can send it directly to your pharmacy.  If lab work is needed we can place an order for that and you can then stop by our lab to have the test done at a later time.    Video visits are billed at different rates depending on your insurance coverage.  Please reach out to your insurance provider with any questions.    If during the course of the call the physician/provider feels a video visit is not appropriate, you will not be charged for this service.\"    Patient has given verbal consent for Video visit? Yes    How would you like to obtain your AVS? MyChart    If the video visit is dropped, the invitation should be resent by: Send to e-mail at: [email or web address]    Will anyone else be joining your video visit? No    I    Video-Visit Details    Type of service:  Video Visit    Originating Location (pt. Location): Home    Distant Location (provider location):   Off-Site - Provider Home Office    Platform used for Video Visit: [employer]    Video Start Time: 10:31 AM    Video End Time:11:24 AM          New Medical Weight Management Consult      PATIENT:  Bettina Blackman  MRN:         [id number]  :         1990  BRANDY:         3/22/2024      Dear Ritika Armstrong MD,    I had the pleasure of seeing your patient, Bettina Blackman. Full intake/assessment was done to determine barriers to weight loss success and develop a treatment plan. Bettina Blackman is a 33 year old female interested in treatment of medical problems associated with excess weight. She has a height of 5' 7\"[pt " "reported[, a weight of 291 lbs 0 oz, and the calculated Body mass index is 45.58 kg/m .    Has been heavy since being a child. Gained weight faster after menstruating in middle school.   Has a family history of obesity  Took OCP in college an noticed more gain      ASSESSMENT & PLAN:    Problem List Items Addressed This Visit       Morbid obesity (H) - Primary     Start Wegovy         Relevant Medications    Semaglutide-Weight Management (WEGOVY) 0.25 MG/0.5ML pen    Semaglutide-Weight Management (WEGOVY) 0.5 MG/0.5ML pen    Semaglutide-Weight Management (WEGOVY) 1 MG/0.5ML pen    Other Relevant Orders    Nutrition Referral    PCOS (polycystic ovarian syndrome)    Hepatic steatosis        PROGRAM OVERVIEW  Reviewed options at [facility] including provider visits, dietician, 24 week healthy lifestyle program, health coaching, food supplements, Get Moving program, and psychological support.  All questions about weight loss program were answered.    SURGICAL WEIGHT LOSS   Option presented given pt BMI and current comorbid conditions. No current interest.  Makes nervous      MEDICATIONS:  We discussed healthy habits to assist with weight loss. We reviewed medications associated with weight gain. We discussed the role of pharmacological agents in the treatment of obesity and the \"off-label\" use of medications in this practice. We reviewed medication that may assist with weight loss. Indications, contraindications, risks/benefits, and potential side effects were discussed.  Wegovy was prescribed. Advised to not start until has 2 months supply in hand. Discussed that medications must always be used together with lifestyle changes such as improvements in diet choices, portion control and establishing and maintaining a regular exercise program.     AOM Considerations:  Phentermine:  Not currently due to elevated BP  Topiramate: birth control concerns  GLP-1: great option   Wegovy sent over.  Patient has no " "history of pancreatitis. Patient has no personal or family history of medullary thyroid carcinoma or MEN2.   Naltrexone:    Wellbutrin: good option  Metformin: Great second option. Has PCOS           PATIENT INSTRUCTIONS:  See dietitian  Have breakfast daily   60 oz water daily   Start Wegovy        Follow up: Return to clinic in 3 and 6 months    57 minutes spent on the date of the encounter doing chart review, review of test results, patient visit and documentation     She has the following co-morbidities:        3/21/2024     4:21 PM   --   I have the following health issues associated with obesity Polycystic Ovarian Syndrome    Fatty Liver   I have the following symptoms associated with obesity Back Pain    Irregular Menstral Cycle             3/21/2024     4:21 PM   Referring Provider   Please name the provider who referred you to Medical Weight Management  If you do not know, please answer \"I Don't Know\" Ritika Armstrong           3/21/2024     4:21 PM   Weight History   How concerned are you about your weight? Very Concerned   I became overweight As a Child   The following factors have contributed to my weight gain A Health Crisis    Eating Wrong Types of Food    Eating Too Much    Lack of Exercise    Genetic (Runs in the Family)    Stress   I have tried the following methods to lose weight Watching Portions or Calories    Exercise    Weight Watchers    Meal Replacements    Fasting   My lowest weight since age 17 was 260   My highest weight since age 17 was 292   The most weight I have ever lost was (lbs) 25   I have the following family history of obesity/being overweight My mother is overweight    My father is overweight    One or more of my siblings are overweight    Many of my relatives are overweight   How has your weight changed over the last year? Gained   How many pounds? 10     Lost 25 lbs with NOOM - did not think successful long term due to restrictions    Eats within an hour or 2 of getting up.  Skips " breakfast about 2-3 days weekly   Water: 40 oz water and 2 glasses of tea daily         3/21/2024     4:21 PM   Diet Recall Review with Patient   If you do eat breakfast, what types of food do you eat? Oatmeal, toast, eggs, or fruit   If you do eat supper, what types of food do you typically eat? Protein, carbs (rice, potatoes) - today will have leftover chicken patties with rice and veggies  D: last night had [place]   If you do snack, what types of food do you typically eat? Chips, sweets, fruit  Midmorning and Mid afternoon snacks   How many glasses of juice do you drink in a typical day? 0   How many of glasses of milk do you drink in a typical day? 0   How many 8oz glasses of sugar containing drinks such as [employer]-A[employer]/sweet tea do you drink in a day? 0   How many cans/bottles of sugar pop/soda/tea/sports drinks do you drink in a day? 0   How many cans/bottles of diet pop/soda/tea or sports drink do you drink in a day?    How often do you have a drink of alcohol? 2-4 Times a Month - maybe once weekly will have 1-2 drinks    If you do drink, how many drinks might you have in a day? 1 or 2           3/21/2024     4:21 PM   Eating Habits   Generally, my meals include foods like these bread, pasta, rice, potatoes, corn, crackers, sweet dessert, pop, or juice A Few Times a Week   Generally, my meals include foods like these fried meats, brats, burgers, french fries, pizza, cheese, chips, or ice cream Once a Week   Eat fast food (like McDonalds, [employer], Taco Bell) Less Than Weekly   Eat at a buffet or sit-down restaurant Once a Week   Eat most of my meals in front of the TV or computer A Few Times a Week   Often skip meals, eat at random times, have no regular eating times Almost Everyday   Rarely sit down for a meal but snack or graze throughout A Few Times a Week   Eat extra snacks between meals Almost Everyday   Eat most of my food at the end of the day Once a Week   Eat in the middle of the night or wake up at  night to eat Never   Eat extra snacks to prevent or correct low blood sugar Never   Eat to prevent acid reflux or stomach pain Never   Worry about not having enough food to eat Never   I eat when I am depressed Once a Week   I eat when I am stressed Everyday   I eat when I am bored Everyday   I eat when I am anxious Almost Everyday   I eat when I am happy or as a reward Everyday   I feel hungry all the time even if I just have eaten Almost Everyday   Feeling full is important to me Almost Everyday   I finish all the food on my plate even if I am already full Everyday   I can't resist eating delicious food or walk past the good food/smell A Few Times a Week   I eat/snack without noticing that I am eating Everyday   I eat when I am preparing the meal Once a Week   I eat more than usual when I see others eating Once a Week   I have trouble not eating sweets, ice cream, cookies, or chips if they are around the house Everyday   I think about food all day Once a Week   What foods, if any, do you crave? Sweets/Candy/Chocolate     Does not feel like she binges. Does a lot of emotional eating.       3/21/2024     4:21 PM   Amount of Food   I feel out of control when eating Almost Everyday   I eat a large amount of food, like a loaf of bread, a box of cookies, a pint/quart of ice cream, all at once Monthly   I eat a large amount of food even when I am not hungry Weekly   I eat rapidly Almost Everyday   I eat alone because I feel embarrassed and do not want others to see how much I have eaten Never   I eat until I am uncomfortably full Monthly   I feel bad, disgusted, or guilty after I overeat Weekly   Has guilt - not sure why eating so much.         3/21/2024     4:21 PM   Activity/Exercise History   How much of a typical 12 hour day do you spend sitting? Most of the Day   How much of a typical 12 hour day do you spend lying down? Less Than Half the Day   How much of a typical day do you spend walking/standing? Less Than Half  the Day   How many hours (not including work) do you spend on the TV/Video Games/Computer/Tablet/Phone? 2-3 Hours   How many times a week are you active for the purpose of exercise? Once a Week   What keeps you from being more active? Lack of Time    Unsure What To Do    Worried People Will Look At Me   How many total minutes do you spend doing some activity for the purpose of exercising when you exercise? 15-30 Minutes     Patient takes dog on walks 1-2 times daily. Does have some anxiety going to gym.       PAST MEDICAL HISTORY:  Past Medical History:   Diagnosis Date    PCOS (polycystic ovarian syndrome)            3/21/2024     4:21 PM   Work/Social History Reviewed With Patient   My employment status is Full-Time   My job is Clinical Research Supervisor   How much of your job is spent on the computer or phone? 75%   How many hours do you spend commuting to work daily? 0   What is your marital status? Single   Who do you live with? Alone   Who does the food shopping? Me       Social History     Tobacco Use    Smoking status: Never    Smokeless tobacco: Never   Substance Use Topics    Alcohol use: Yes    Drug use: No            3/21/2024     4:21 PM   Mental Health History Reviewed With Patient   Have you ever been physically or sexually abused? No   How often in the past 2 weeks have you felt little interest or pleasure in doing things? Not at all   Over the past 2 weeks how often have you felt down, depressed, or hopeless? Not at all           3/21/2024     4:21 PM   Sleep History Reviewed With Patient   How many hours do you sleep at night? 7       MEDICATIONS:   Current Outpatient Medications   Medication Sig Dispense Refill    Semaglutide-Weight Management (WEGOVY) 0.25 MG/0.5ML pen Inject 0.25 mg Subcutaneous every 7 days 2 mL 0    Semaglutide-Weight Management (WEGOVY) 0.5 MG/0.5ML pen Inject 0.5 mg Subcutaneous every 7 days 2 mL 1    Semaglutide-Weight Management (WEGOVY) 1 MG/0.5ML pen Inject 1 mg  Subcutaneous every 7 days 2 mL 1       ALLERGIES:   No Known Allergies    ROS:  HEENT  H/O glaucoma:  no  Cardiovascular  CAD:   no  Palpitations:   no  HTN:    no  Gastrointestinal  GERD:   no  Constipation:   no  Liver Dz:   Fatty liver   H/O Pancreatitis:  no  H/O Gallbladder Dz: no  Psychiatric  Moods Stable:  Yes   Anxiety:   no  Depression:  no  Bipolar:  no  H/O ETOH/Drug Use: no  H/O eating disorder: no  Endocrine  PMH/FMH of MTC or MEN2:  no  Neurologic:  H/O seizures:   no  Headaches:  no  Memory Impairment:  no    H/O kidney stones:  no  Kidney disease:  no  Current birth control:  no      LABS/RECORDS REVIEWED:  Vitamin D Deficiency screening   Date Value Ref Range Status   01/04/2013 14 (L) 30 - 75 ug/L Final     Comment:     Season, race, dietary intake, and treatment affect the concentration of   25-hydroxy-Vitamin D. Values may decrease during winter months and increase   during summer months. Values less than 30 ug/L may indicate Vitamin D   deficiency.   Vitamin D determiniation is routinely performed by an immunoassay specific   for   25 hydroxyvitamin D3.  If an individual is on vitamin D2 (ergocalciferol)   supplementation, please specify 25 OH vitamin D2 and D3 level determination   by   LCMSMS test VITD23.  For questions, please contact the laboratory at   [phone].     Sodium   Date Value Ref Range Status   01/18/2024 139 135 - 145 mmol/L Final     Comment:     Reference intervals for this test were updated on 09/26/2023 to more accurately reflect our healthy population. There may be differences in the flagging of prior results with similar values performed with this method. Interpretation of those prior results can be made in the context of the updated reference intervals.    04/27/2021 137 133 - 144 mmol/L Final     Potassium   Date Value Ref Range Status   01/18/2024 4.4 3.4 - 5.3 mmol/L Final   04/27/2021 3.8 3.4 - 5.3 mmol/L Final     Chloride   Date Value Ref Range Status    01/18/2024 108 (H) 98 - 107 mmol/L Final   04/27/2021 109 94 - 109 mmol/L Final     Carbon Dioxide   Date Value Ref Range Status   04/27/2021 26 20 - 32 mmol/L Final     Carbon Dioxide (CO2)   Date Value Ref Range Status   01/18/2024 23 22 - 29 mmol/L Final     Anion Gap   Date Value Ref Range Status   01/18/2024 8 7 - 15 mmol/L Final   04/27/2021 2 (L) 3 - 14 mmol/L Final     Glucose   Date Value Ref Range Status   01/18/2024 92 70 - 99 mg/dL Final   04/27/2021 93 70 - 99 mg/dL Final     Urea Nitrogen   Date Value Ref Range Status   01/18/2024 8.9 6.0 - 20.0 mg/dL Final   04/27/2021 12 7 - 30 mg/dL Final     Creatinine   Date Value Ref Range Status   01/18/2024 0.84 0.51 - 0.95 mg/dL Final   04/27/2021 0.83 0.52 - 1.04 mg/dL Final     GFR Estimate   Date Value Ref Range Status   01/18/2024 >90 >60 mL/min/1.73m2 Final   04/27/2021 >90 >60 mL/min/[1.73_m2] Final     Comment:     Non  GFR Calc  Starting 12/18/2018, serum creatinine based estimated GFR (eGFR) will be   calculated using the Chronic Kidney Disease Epidemiology Collaboration   (CKD-EPI) equation.       Calcium   Date Value Ref Range Status   01/18/2024 9.0 8.6 - 10.0 mg/dL Final   04/27/2021 8.6 8.5 - 10.1 mg/dL Final     Bilirubin Total   Date Value Ref Range Status   01/18/2024 0.6 <=1.2 mg/dL Final     Alkaline Phosphatase   Date Value Ref Range Status   01/18/2024 64 40 - 150 U/L Final     Comment:     Reference intervals for this test were updated on 11/14/2023 to more accurately reflect our healthy population. There may be differences in the flagging of prior results with similar values performed with this method. Interpretation of those prior results can be made in the context of the updated reference intervals.     ALT   Date Value Ref Range Status   01/18/2024 28 0 - 50 U/L Final     Comment:     Reference intervals for this test were updated on 6/12/2023 to more accurately reflect our healthy population. There may be  "differences in the flagging of prior results with similar values performed with this method. Interpretation of those prior results can be made in the context of the updated reference intervals.       AST   Date Value Ref Range Status   01/18/2024 23 0 - 45 U/L Final     Comment:     Reference intervals for this test were updated on 6/12/2023 to more accurately reflect our healthy population. There may be differences in the flagging of prior results with similar values performed with this method. Interpretation of those prior results can be made in the context of the updated reference intervals.     WBC   Date Value Ref Range Status   04/27/2021 7.0 4.0 - 11.0 10e9/L Final     WBC Count   Date Value Ref Range Status   01/18/2024 5.8 4.0 - 11.0 10e3/uL Final     Hemoglobin   Date Value Ref Range Status   01/18/2024 12.4 11.7 - 15.7 g/dL Final   04/27/2021 12.6 11.7 - 15.7 g/dL Final     Hematocrit   Date Value Ref Range Status   01/18/2024 38.2 35.0 - 47.0 % Final   04/27/2021 38.7 35.0 - 47.0 % Final     MCV   Date Value Ref Range Status   01/18/2024 87 78 - 100 fL Final   04/27/2021 88 78 - 100 fl Final     Platelet Count   Date Value Ref Range Status   01/18/2024 248 150 - 450 10e3/uL Final   04/27/2021 304 150 - 450 10e9/L Final         BP Readings from Last 6 Encounters:   02/28/24 134/85   01/18/24 (!) 144/90   01/17/24 (!) 145/94   04/27/21 (!) 150/80   09/19/19 126/88   08/28/18 118/72       Pulse Readings from Last 6 Encounters:   02/28/24 62   01/18/24 70   01/17/24 74   04/27/21 81   09/19/19 75   08/28/18 87       PHYSICAL EXAM:  Ht 5' 7\" (1.702 m)   Wt 291 lb (132 kg)   BMI 45.58 kg/m    GENERAL: Healthy, alert and no distress  EYES: Eyes grossly normal to inspection.  No discharge or erythema, or obvious scleral/conjunctival abnormalities.  RESP: No audible wheeze, cough, or visible cyanosis.  No visible retractions or increased work of breathing.    SKIN: Visible skin clear. No significant rash, " abnormal pigmentation or lesions.  NEURO: Cranial nerves grossly intact.  Mentation and speech appropriate for age.  PSYCH: Mentation appears normal, affect normal/bright, judgement and insight intact, normal speech and appearance well-groomed.    COUNSELING:   Reviewed obesity as a chronic disease and comprehensive management stratagies.      We discussed Bariatric Basics including:  -eating 3 meals daily  -eating protein first  -eating slowly, chewing food well  -avoiding/limiting calorie containing beverages  -limiting carbohydrates and changing to whole grains  -limiting restaurant or cafeteria eating to twice a week or less    We discussed the importance of restorative sleep and stress management in maintaining a healthy weight.  We discussed insulin resistance and glycemic index as it relates to appetite and weight control.   We discussed the importance of physical activity including cardiovascular and strength training in maintaining a healthier weight and explored viable options.  Patient education of above written in AVS.      Sincerely,    Kimberli Beckwith PA-C

## 2024-03-21 ENCOUNTER — TELEPHONE (OUTPATIENT)
Dept: SURGERY | Facility: CLINIC | Age: 34
End: 2024-03-21
Payer: COMMERCIAL

## 2024-03-21 ASSESSMENT — SLEEP AND FATIGUE QUESTIONNAIRES
HOW LIKELY ARE YOU TO NOD OFF OR FALL ASLEEP WHILE SITTING AND READING: MODERATE CHANCE OF DOZING
HOW LIKELY ARE YOU TO NOD OFF OR FALL ASLEEP WHILE SITTING INACTIVE IN A PUBLIC PLACE: WOULD NEVER DOZE
HOW LIKELY ARE YOU TO NOD OFF OR FALL ASLEEP WHILE SITTING QUIETLY AFTER LUNCH WITHOUT ALCOHOL: SLIGHT CHANCE OF DOZING
HOW LIKELY ARE YOU TO NOD OFF OR FALL ASLEEP WHILE WATCHING TV: HIGH CHANCE OF DOZING
HOW LIKELY ARE YOU TO NOD OFF OR FALL ASLEEP WHILE LYING DOWN TO REST IN THE AFTERNOON WHEN CIRCUMSTANCES PERMIT: HIGH CHANCE OF DOZING
HOW LIKELY ARE YOU TO NOD OFF OR FALL ASLEEP IN A CAR, WHILE STOPPED FOR A FEW MINUTES IN TRAFFIC: WOULD NEVER DOZE
HOW LIKELY ARE YOU TO NOD OFF OR FALL ASLEEP WHEN YOU ARE A PASSENGER IN A CAR FOR AN HOUR WITHOUT A BREAK: MODERATE CHANCE OF DOZING
HOW LIKELY ARE YOU TO NOD OFF OR FALL ASLEEP WHILE SITTING AND TALKING TO SOMEONE: WOULD NEVER DOZE

## 2024-03-21 NOTE — TELEPHONE ENCOUNTER
Called patient at primary phone number and left message to complete questionnaire prior to appointment.   Nora Hyde, CMA

## 2024-03-22 ENCOUNTER — VIRTUAL VISIT (OUTPATIENT)
Dept: SURGERY | Facility: CLINIC | Age: 34
End: 2024-03-22
Payer: COMMERCIAL

## 2024-03-22 ENCOUNTER — ALLIED HEALTH/NURSE VISIT (OUTPATIENT)
Dept: SURGERY | Facility: CLINIC | Age: 34
End: 2024-03-22
Payer: COMMERCIAL

## 2024-03-22 VITALS — HEIGHT: 67 IN | BODY MASS INDEX: 45.96 KG/M2 | WEIGHT: 292.8 LBS

## 2024-03-22 VITALS — HEIGHT: 67 IN | BODY MASS INDEX: 45.67 KG/M2 | WEIGHT: 291 LBS

## 2024-03-22 DIAGNOSIS — E66.01 MORBID OBESITY (H): Primary | ICD-10-CM

## 2024-03-22 DIAGNOSIS — K76.0 HEPATIC STEATOSIS: ICD-10-CM

## 2024-03-22 DIAGNOSIS — E28.2 PCOS (POLYCYSTIC OVARIAN SYNDROME): ICD-10-CM

## 2024-03-22 PROCEDURE — 99204 OFFICE O/P NEW MOD 45 MIN: CPT | Mod: 95 | Performed by: PHYSICIAN ASSISTANT

## 2024-03-22 PROCEDURE — 97802 MEDICAL NUTRITION INDIV IN: CPT

## 2024-03-22 RX ORDER — SEMAGLUTIDE 0.5 MG/.5ML
0.5 INJECTION, SOLUTION SUBCUTANEOUS
Qty: 2 ML | Refills: 1 | Status: SHIPPED | OUTPATIENT
Start: 2024-03-22

## 2024-03-22 RX ORDER — SEMAGLUTIDE 1 MG/.5ML
1 INJECTION, SOLUTION SUBCUTANEOUS
Qty: 2 ML | Refills: 1 | Status: SHIPPED | OUTPATIENT
Start: 2024-03-22

## 2024-03-22 RX ORDER — SEMAGLUTIDE 0.25 MG/.5ML
0.25 INJECTION, SOLUTION SUBCUTANEOUS
Qty: 2 ML | Refills: 0 | Status: SHIPPED | OUTPATIENT
Start: 2024-03-22

## 2024-03-22 NOTE — PROGRESS NOTES
MEDICAL WEIGHT LOSS INITIAL EVALUATION  Patient accompanied by:  self  DIAGNOSIS:  Obesity Class III    NUTRITION HISTORY:  Diet and exercise history per pre-visit questionnaire as follows:    Based on your typical week, how often do you do the following?    Generally, my meals include foods like these: bread, pasta, rice, potatoes, corn, crackers, sweet dessert, pop, or juice. A Few Times a Week   Generally, my meals include foods like these: fried meats, brats, burgers, french fries, pizza, cheese, chips, or ice cream. Once a Week   Eat fast food (like [employer], [employer], [employer]). Less Than Weekly   Eat at a buffet or sit-down restaurant. Once a Week   Eat most of my meals in front of the TV or computer. A Few Times a Week   Often skip meals, eat at random times, have no regular eating times. Almost Everyday   Rarely sit down for a meal but snack or graze throughout. A Few Times a Week   Eat extra snacks between meals. Almost Everyday   Eat most of my food at the end of the day. Once a Week   Eat in the middle of the night or wake up at night to eat. Never   Eat extra snacks to prevent or correct low blood sugar. Never   Eat to prevent acid reflux or stomach pain. Never   Worry about not having enough food to eat. Never   Have you been to the food shelf at least a few times this year? No   Please answer the following questions based on your eating patterns during a typical week.    I eat when I am depressed. Once a Week   I eat when I am stressed. Everyday   I eat when I am bored. Everyday   I eat when I am anxious. Almost Everyday   I eat when I am happy or as a reward. Everyday   I feel hungry all the time even if I just have eaten. Almost Everyday   Feeling full is important to me. Almost Everyday   I finish all the food on my plate even if I am already full. Everyday   I can't resist eating delicious food or walk past the good food/smell. A Few Times a Week   I eat/snack without noticing that I am  eating. Everyday   I eat when I am preparing the meal. Once a Week   I eat more than usual when I see others eating. Once a Week   I have trouble not eating sweets, ice cream, cookies, or chips if they are around the house. Everyday   I think about food all day. Once a Week   What foods, if any, do you crave? Sweets/Candy/Chocolate   Please list any other foods you crave?    Please answer the following questions regarding the amount of food you have eaten over the past 6 months.    I feel out of control when eating. Almost Everyday   I eat a large amount of food, like a loaf of bread, a box of cookies, a pint/quart of ice cream, all at once. Monthly   I eat a large amount of food even when I am not hungry. Weekly   I eat rapidly. Almost Everyday   I eat alone because I feel embarrassed and do not want others to see how much I have eaten. Never   I eat until I am uncomfortably full. Monthly   I feel bad, disgusted, or guilty after I overeat. Weekly   I make myself vomit what I have eaten or use laxatives to get rid of food. Never   Please answer the following questions regarding if you usually eat a meal or not.  Please list all foods where appropriate. There are no wrong or right foods.      Do you typically eat breakfast? Yes   If you do eat breakfast, what types of food do you eat? Oatmeal, toast, eggs, or fruit   Do you typically eat lunch? No   Do you typically eat supper? Yes   If you do eat supper, what types of food do you typically eat? Protein, carbs (rice, potatoes)   Do you typically eat snacks? Yes   If you do snack, what types of food do you typically eat? Chips, sweets, fruit   Do you like vegetables? Yes   Do you drink water? Yes   How many glasses of juice do you drink in a typical day? 0   How many of glasses of milk do you drink in a typical day? 0   How many 8oz glasses of sugar containing drinks such as [employer]-A[employer]/sweet tea do you drink in a day? 0   How many cans/bottles of sugar  "pop/soda/tea/sports drinks do you drink in a day? 0   How many cans/bottles of diet pop/soda/tea or sports drink do you drink in a day? 0   How often do you have a drink of alcohol? 2-4 Times a Month   If you do drink, how many drinks might you have in a day? 1 or 2   Please answer these questions based on a typical 12 hour day including time at work and home.    How much of a typical 12 hour day do you spend sitting? Most of the Day   How much of a typical 12 hour day do you spend lying down? Less Than Half the Day   How much of a typical day do you spend walking/standing? Less Than Half the Day   How many hours (not including work) do you spend on the TV/Video Games/Computer/Tablet/Phone? 2-3 Hours   How many times a week are you active for the purpose of exercise? Once a Week   What keeps you from being more active? Lack of Time    Unsure What To Do    Worried People Will Look At Me       ADDITIONAL INFORMATION: Pt believes her biggest challenge is craving sweets in the afternoons/evenings. May skip breakfast and/or eat later once morning meetings are over. Pt w/PCOS; discussed the role of insulin resistance in weight management. Appropriate questions about AOM expectations.       ANTHROPOMETRICS:  Height: 67\"   Weight: 292 lbs 12.8 oz  BMI:  45.86 kg/m2  NUTRITION DIAGNOSIS:   Obese class III related to overeating and poor lifestyle habits as evidence by patient's subjective statements and  BMI of 45.86 kg/m2   NUTRITION INTERVENTIONS  Nutrition Prescription:  Recommend modified energy- nutrient intake  Implementation:  Nutrition Education (Content):  Discussed portion sizes and plate method  Provided: Tips for Weight Loss and Weight Management, Protein Sources for Weight Loss, Fiber Content in Food, Plate Method    Nutrition Education (Application):   Patient to practice goals as stated below  Patient verbalizes understanding of diet by stating she will eat breakfast earlier in the morning.   Anticipate good " compliance    Goals:  Have a protein shake or balanced breakfast within 1 hour of waking up  Have a mid afternoon snack that includes protein      FOLLOW UP AND MONITORING:    Other  - follow up in 4-8 weeks.     TIME SPENT WITH PATIENT:   35 minutes     Deanna Pham RD, LD  Clinical Dietitian

## 2024-03-22 NOTE — PATIENT INSTRUCTIONS
"Nice to talk with you today! Thank you for allowing me the privilege of caring for you.   We hope we provided you with the excellent service you deserve.     To ensure the quality of our services you may receive a patient satisfaction survey from an independent monitoring company.  The greatest compliment you can give is \"Likely to Recommend\"      Below is our plan we discussed.-  SHAKA Ag      PATIENT INSTRUCTIONS:  See dietitian  Have breakfast daily   60 oz water daily   Start Wegovy       Please call [phone] and schedule a follow up with Kimberli Beckwith PA-C in 3 and 6 months as well as a second dietitian visit.  If you need to reach me sooner you can do so by calling [phone].    Have a great day!         WEGOVY (semaglutide)      Wegovy (semaglutide) injection 2.4 mg is an injectable prescription medicine used for adults with obesity (BMI ?30) or overweight (excess weight) (BMI ?27) who also have weight-related medical problems to help them lose weight and keep the weight off.  It is a GLP-1 agonist medication. GLP1 agonists stimulate the hormone GLP-1 in your body o allow you to feel full quickly and stay full longer.    Due to the shortage, You may need to be persistent and patient to get these initial dosages due to the shortage.  Once you are able to obtain the 0.25 and 0.5 mg and 1 mg dose \"12 weeks of therapy\" you can begin treatment.     Directions:  Start Wegovy (semaglutide) 0.25 mg once weekly for 4 weeks, then if tolerating increase to 0.5 mg weekly for 4 weeks, then if tolerating increase to 1 mg weekly for 4 weeks, then if tolerating increase to 1.7 mg weekly for 4 weeks, then if tolerating increase to 2.4 mg weekly thereafter.      -Each Wegovy pen is a once weekly single-dose prefilled pen with a pen injector already built within the pen. Discard the Wegovy pen after use in sharps container.     Common Side Effects:    nausea, headache, diarrhea, stomach upset.  If these become " unmanageable call or mychart.    Serious Side effects:   Pancreatitis (inflammation of the pancreas) has been associated with this type of medication, but is very rare.Symptoms of pancreatitis include: Pain in your upper stomach area which may travel to your back and be worse after eating.     Storage:   Store the prescription in the refrigerator. Once it is time to use the Wegovy pen, you can keep the pen at room temperature and it is good for up to 28 days at room temperature.     How to inject:  For a video on how to use the pen please go to:  https://www.[employer]/about-wegovy/how-to-use-the-wegovy-pen.html#itemTwo       For any questions or concerns please send a [employer] message to our team or call our weight management call center at [phone] during regular business hours. For questions during evenings or weekends your messages will be addressed during the next business day.  For emergencies please call 911 or seek immediate medical care.

## 2024-03-22 NOTE — PATIENT INSTRUCTIONS
"Mark Dunbar!        It was great meeting with you today! Here are some links to the handouts I referenced:        Protein Sources:  [email or web address]     Fiber Sources:  [email or web address]     My Plate:  https://www.choosemyplate.gov/        A helpful search term to type into [employer], [employer], etc is \"myplate examples.\" [myplate examples - Google Search]     Key points from today:  Eat 3 meals/day at consistent intervals  Shoot for  protein (20-30g/meal)  Aim for 25-35g fiber (5-10g/meal)  Eat slowly: 20-30 minutes per meal     Here is a summary of the goals that we discussed:     1. Eat breakfast within 1 hour of waking up   - Option 1: have a protein shake within an hour of waking up, then balanced breakfast mid-morning  - Option 2: balanced breakfast within 1 hour of waking up - see \"easy foods list\"    Protein Shake Nutrition:  <250 calories  15-30g protein  <10g fat  <10g sugar    Examples: Premier Protein, Fair Life Protein, Ensure Max     2. Have a mid-afternoon snack that includes protein             Let's plan on following up in 1-2 months. This can be scheduled via our call center at . Of course, reach out sooner if you have any questions or concerns. Have a great week and welcome to the program!        Deanna Pham, ROYA, LD?  Clinical Dietitian   "

## 2024-04-26 NOTE — PROGRESS NOTES
"Bettina is a 33 year old who is being evaluated via a billable video visit.      The patient has been notified of following:     \"This video visit will be conducted via a call between you and your physician/provider. We have found that certain health care needs can be provided without the need for an in-person physical exam.  This service lets us provide the care you need with a video conversation.  If a prescription is necessary we can send it directly to your pharmacy.  If lab work is needed we can place an order for that and you can then stop by our lab to have the test done at a later time.    Video visits are billed at different rates depending on your insurance coverage.  Please reach out to your insurance provider with any questions.    If during the course of the call the physician/provider feels a video visit is not appropriate, you will not be charged for this service.\"    Patient has given verbal consent for Video visit? Yes    How would you like to obtain your AVS? MyChart    If the video visit is dropped, the invitation should be resent by: Send to e-mail at: [email or web address]    Will anyone else be joining your video visit? No    I    Video-Visit Details    Type of service:  Video Visit    Originating Location (pt. Location): Home    Distant Location (provider location):   Off-Site - Provider Home Office    Platform used for Video Visit: [employer]    Video Start Time:  9:01    Video End Time: 9:34      MEDICAL WEIGHT LOSS FOLLOW UP  Patient accompanied by:self      DIAGNOSIS:  Class III Obesity (based on weight at last RD visit in clinic)    NUTRITION HISTORY:    Breakfast: 1/2 cup oatmeal + black berries or blue berries + concepción seeds or protein drink, eggs or slice toast with peanut butter @ 7-7:30     Lunch:  brings from home or at home-tuna chicken salad made with greek yogurt + mustard, rice cake or bread or lettuce wrap @ noon-12:30     Dinner: basmati rice or other grain , chicken or beef stir " "damon or fish, veggies, gain @ 6 pm     Snacks: portion controlled trail mix or dark chocolate or cashews     Beverage Choices:  120 oz water, protein drink, 1 cup coffee, peppermint tea, carbonated water, ETOH-none since January     Eating Behaviors: not now- feels guilty about sweet craving     Dining Out: 1X per week-Sweet Greens or Mexican-shrimp tacos- can't finish meal     EXERCISE:  Type: weight training video/ Pilate's reformer + walks dog most days + walking pad in office  Frequency: 1/1-2 days per week  Duration: 30/50 minutes    ADDITIONAL INFORMATION:  Patient feels she does best with fiber intake the morning then it slows down throughout the day. Tends to get constipated when gets periods. Craves sweets in the afternoon. Getting full a little bit faster. Patient had many appropriate questions.      ANTHROPOMETRICS:    Initial Weight: 292.8 pounds    Height: 67\"    Current Weight:  n/a     Weight Change: n/a     BMI: n/a kg/m2    MEDICATION FOR WEIGHT LOSS:  Wegovy-started 1 week ago    EVALUATION/PROGRESS TOWARDS GOALS:  Previous Goals:  Have a protein shake or balanced breakfast within 1 hour of waking up-met  Have a mid afternoon snack that includes protein-no met         Previous Nutrition Diagnosis:  Obese class III related to overeating and poor lifestyle habits as evidence by patient's subjective statements and  BMI of 45.86 kg/m2     Current Nutrition Diagnosis:   Obese class III related to overeating and poor lifestyle habits as evidence by patient's subjective statements and  BMI of 45.86 kg/m2-based on weight at last visit  No change      INTERVENTION:    Nutrition Prescription:  Recommend modified nutrient intake by decreasing energy intake    Implementation:    Meals and Snacks: 3/1    Nutrition Education (Content):  Discussed previous goals and determined new goals  Encourage physical activity  Supported patient in attempted weight loss and behavior changes   Congratulated patient on " successful behavior changes   Patient verbalizes understanding of weight management by asking many appropriate questions  Anticipate good compliance    Goals:  When craving sweets try-fruit or protein drink or portion controlled low calorie dessert (ex-frozen yogurt bar)  Criteria for selecting a protein drink/8-12 ounces:  < 250 calories  15-30 grams protein  < 10 grams total fat  < 10 grams sugar   If getting full fast aim to eat 4-5 small meals/day  Replace refined grains with whole grains    Follow Up/Monitoring:  Other  -  patient to follow up in 4-6 weeks    Time Spent With Patient:  32 Minutes    Raul Kelley RD, LD  [facility]

## 2024-05-01 ENCOUNTER — VIRTUAL VISIT (OUTPATIENT)
Dept: SURGERY | Facility: CLINIC | Age: 34
End: 2024-05-01
Payer: COMMERCIAL

## 2024-05-01 DIAGNOSIS — E66.01 MORBID OBESITY (H): Primary | ICD-10-CM

## 2024-05-01 PROCEDURE — 97803 MED NUTRITION INDIV SUBSEQ: CPT | Mod: 95

## 2024-05-01 NOTE — PATIENT INSTRUCTIONS
Mark Zapata-  It was great to visit with you and learn about your progress. Below are the goals we discussed.  Goals:  When craving sweets try-fruit or protein drink or portion controlled low calorie dessert (ex-frozen yogurt bar)  Criteria for selecting a protein drink/8-12 ounces:  < 250 calories  15-30 grams protein  < 10 grams total fat  < 10 grams total sugar   If getting full fast aim to eat 4-5 small meals/day  Replace refined grains with whole grains    Nutrition Educational Materials:  Will send in [employer] mail    Please call [phone] to schedule your next visit with a Dietitian in 4-6 weeks  Thanks!  Raul Kelley RD, SHELL  [facility], [place]

## 2024-05-21 ENCOUNTER — MYC MEDICAL ADVICE (OUTPATIENT)
Dept: FAMILY MEDICINE | Facility: CLINIC | Age: 34
End: 2024-05-21
Payer: COMMERCIAL

## 2024-05-22 ENCOUNTER — VIRTUAL VISIT (OUTPATIENT)
Dept: FAMILY MEDICINE | Facility: CLINIC | Age: 34
End: 2024-05-22
Payer: COMMERCIAL

## 2024-05-22 DIAGNOSIS — F43.23 ADJUSTMENT DISORDER WITH MIXED ANXIETY AND DEPRESSED MOOD: Primary | ICD-10-CM

## 2024-05-22 PROCEDURE — 99212 OFFICE O/P EST SF 10 MIN: CPT | Mod: 95 | Performed by: INTERNAL MEDICINE

## 2024-05-22 NOTE — LETTER
"      May 22, 2024           Betitna Blackman was evaluated on May 22, 2024.  She has a medical that is significantly alleviated by the presence of her pet mini darnell \"[place]\".  Therefore, it is medically necessary for her to have her pet dog as an emotional support animal.  Please make any accommodations available to her to make this possible.      Sincerely,    Catracho Stacy MD                 "

## 2024-05-22 NOTE — PROGRESS NOTES
"Bettina is a 33 year old who is being evaluated via a billable video visit.    How would you like to obtain your AVS? MyChart  If the video visit is dropped, the invitation should be resent by: Text to cell phone: [phone]  Will anyone else be joining your video visit? No      Assessment & Plan     Adjustment disorder with mixed anxiety and depressed mood  I provided her documentation suggesting that there is a medical benefit to her pet dog as an emotional support animal.  I also provided some counseling to help her manage anxiety symptoms.  I strongly recommended obtaining 150 minutes of moderate to vigorous physical activity each week.  We discussed the possibility of a referral to a mental health counselor.  At this point, she thinks the risks of engaging with a counselor might exceed the benefits as there is a significant out-of-pocket cost for her and the time commitment would add to her stress burden.            FUTURE APPOINTMENTS:       -She should establish with a new primary care physician as soon as an appointment is available    Subjective   Bettina is a 33 year old, presenting for the following health issues:  Letter Request (Letter emotional support animal.)    HPI       Pleasant 33-year-old female who scheduled a video visit to request a letter documenting the medical necessity of an emotional support animal.  She does not have a formal mental health diagnosis although she has seen counselors or therapists for anxiety in the past.  She describes the onset of social anxiety and anxiety symptoms during the COVID pandemic.  Since then, she has observed significant symptom improvement when she is in the presence of her pet darnell \"Tim\".            Objective           Vitals:  No vitals were obtained today due to virtual visit.    Physical Exam   GENERAL: alert and no distress  EYES: Eyes grossly normal to inspection.  No discharge or erythema, or obvious scleral/conjunctival " abnormalities.  RESP: No audible wheeze, cough, or visible cyanosis.    SKIN: Visible skin clear. No significant rash, abnormal pigmentation or lesions.  NEURO: Cranial nerves grossly intact.  Mentation and speech appropriate for age.  PSYCH: Appropriate affect, tone, and pace of words          Video-Visit Details    Type of service:  Video Visit   Originating Location (pt. Location): Home    Distant Location (provider location):  On-site  Platform used for Video Visit: Phyllis  Signed Electronically by: Catracho Stacy MD

## 2024-06-25 ENCOUNTER — OFFICE VISIT (OUTPATIENT)
Dept: SURGERY | Facility: CLINIC | Age: 34
End: 2024-06-25
Payer: COMMERCIAL

## 2024-06-25 VITALS
HEART RATE: 72 BPM | HEIGHT: 67 IN | DIASTOLIC BLOOD PRESSURE: 84 MMHG | WEIGHT: 283 LBS | BODY MASS INDEX: 44.42 KG/M2 | SYSTOLIC BLOOD PRESSURE: 126 MMHG | OXYGEN SATURATION: 98 %

## 2024-06-25 DIAGNOSIS — E28.2 PCOS (POLYCYSTIC OVARIAN SYNDROME): ICD-10-CM

## 2024-06-25 DIAGNOSIS — E66.01 MORBID OBESITY (H): Primary | ICD-10-CM

## 2024-06-25 DIAGNOSIS — K59.03 DRUG-INDUCED CONSTIPATION: ICD-10-CM

## 2024-06-25 PROCEDURE — 99213 OFFICE O/P EST LOW 20 MIN: CPT | Performed by: PHYSICIAN ASSISTANT

## 2024-06-25 RX ORDER — SEMAGLUTIDE 1.7 MG/.75ML
1.7 INJECTION, SOLUTION SUBCUTANEOUS
Qty: 3 ML | Refills: 1 | Status: SHIPPED | OUTPATIENT
Start: 2024-06-25 | End: 2024-08-27

## 2024-06-25 NOTE — PROGRESS NOTES
Return Medical Weight Management Note         Bettina Blackman  MRN:  [id number]  :  1990  BRANDY:  2024        Dear Ritika Armstrong MD,    I had the pleasure of seeing your patient Bettina Blackman. She is a 34 year old female who I am continuing to see for treatment of obesity related to:        3/21/2024     4:21 PM   --   I have the following health issues associated with obesity Polycystic Ovarian Syndrome    Fatty Liver   I have the following symptoms associated with obesity Back Pain    Irregular Menstral Cycle       Assessment   Problem List Items Addressed This Visit       Morbid obesity (H) - Primary     Continue wegovy         Relevant Medications    Semaglutide-Weight Management (WEGOVY) 1.7 MG/0.75ML pen    PCOS (polycystic ovarian syndrome)    Drug-induced constipation          PLAN/DISCUSSION:  Congratulated patient on overall weight loss! Patient will continue with wegovy as well as increased activity.    Plan:  Get lab drawn in July  Continue wegovy      FOLLOW-UP:  October      SUBJECTIVE/OBJECTIVE:  Patient seen initially 3/22/2024 and was started on wegovy. Appetite is less.Took the first dose of the 1.0 mg last week. Might have some nausea the night of injection only. Is having some constipation and takes senna daily. Was having BM's daily prior to wegovy but now every other day. Getting in adequate hydration.   Met with dietitian and is having protein at every meal. Will be moving apartments in August and will have a better gym. Stress level is less. Has PCOS and does not have regular periods but after first injection. A second period 4 weeks later.       Previous plan:  See dietitian - completed  Have breakfast daily - completed  60 oz water daily - completed  Start Wegovy - completed       Anti-obesity medications:   Current: Wegovy 1.0 mg on Wednesday  Side effects:  Denies  vomiting, abdominal pain, diarrhea, or heartburn          2024     6:54 AM   Weight Loss  Medication History Reviewed With Patient   Which weight loss medications are you currently taking on a regular basis? Wegovy   Are you having any side effects from the weight loss medication that we have prescribed you? Yes   If you are having side effects please describe: Constipation       Recent diet changes: drinks  oz water daily   B: protein oatmeal with flaxseed and a fruit  OR eggs and a meat  L: chicken or tuna salad with greek yogurt  D: meat with brown rice of veggie    Recent exercise/activity changes: trying to get 10k step daily and started pilates trying to go twice weekly. Works up a sweat   Taking moderate paced walks 30-45 minutes. Uses walking pad and gets in about 10-12k steps      CURRENT WEIGHT:   283 lbs 0 oz    Initial Weight (lbs): 291 lbs  Last Visits Weight: 291 lb (132 kg)  Cumulative weight loss (lbs): 8  Weight Loss Percentage: 2.75%        6/21/2024     6:54 AM   Changes and Difficulties   I have made the following changes to my diet since my last visit: Increasing fiber and protein at each meal, never skipping breakfast, increased water intake   With regards to my diet, I am still struggling with: Sugar cravings, hitting my protein and fiber goals   I have made the following changes to my activity/exercise since my last visit: Increased walking, started pilates classes   With regards to my activity/exercise, I am still struggling with: Knowing if im doing enough         MEDICATIONS:   Current Outpatient Medications   Medication Sig Dispense Refill    Semaglutide-Weight Management (WEGOVY) 1 MG/0.5ML pen Inject 1 mg Subcutaneous every 7 days 2 mL 1    Semaglutide-Weight Management (WEGOVY) 1.7 MG/0.75ML pen Inject 1.7 mg Subcutaneous every 7 days 3 mL 1    Semaglutide-Weight Management (WEGOVY) 0.25 MG/0.5ML pen Inject 0.25 mg Subcutaneous every 7 days (Patient not taking: Reported on 6/25/2024) 2 mL 0    Semaglutide-Weight Management (WEGOVY) 0.5 MG/0.5ML pen Inject 0.5 mg  "Subcutaneous every 7 days (Patient not taking: Reported on 6/25/2024) 2 mL 1         ROS:  General  Fatigue: No  Sleep Quality: OK      PHYSICAL EXAM:  Objective    /84   Pulse 72   Ht 5' 7\" (1.702 m)   Wt 283 lb (128.4 kg)   SpO2 98%   BMI 44.32 kg/m    GENERAL: Healthy, alert and no distress  EYES: Eyes grossly normal to inspection.  No discharge or erythema, or obvious scleral/conjunctival abnormalities.  RESP: No audible wheeze, cough, or visible cyanosis.  No visible retractions or increased work of breathing.    SKIN: Visible skin clear. No significant rash, abnormal pigmentation or lesions.  NEURO: Cranial nerves grossly intact.  Mentation and speech appropriate for age.  PSYCH: Mentation appears normal, affect normal/bright, judgement and insight intact, normal speech and appearance well-groomed.        Sincerely,    Kimberli Beckwith PA-C    25 minutes spent on the date of the encounter doing chart review, review of test results, patient visit and documentation     "

## 2024-06-25 NOTE — PATIENT INSTRUCTIONS
"Nice to talk with you today! Thank you for allowing me the privilege of caring for you.   We hope we provided you with the excellent service you deserve.     To ensure the quality of our services you may receive a patient satisfaction survey from an independent monitoring company.  The greatest compliment you can give is \"Likely to Recommend\"      Below is our plan we discussed.-  SHAKA Ag      Plan:  Get lab drawn in July  Continue wegovy    Scheduled for a follow up with Kimberli Beckwith PA-C in October.  If you need to reach me sooner you can do so by calling [phone].    Have a great day!     Eat Better ? Move More ? Live Well    Eat 3 nutrient-rich meals each day    Don t skip meals--it will cause you to overeat later in the day!    Eating fiber (vegetables/fruits/whole grains) and protein with meals helps you stay full longer    Choose foods with less than 10 grams of sugar and 5 grams of fat per serving to prevent excess calories and weight re-gain  Eat around the same times each day to develop a routine eating schedule   Avoid snacking unless physically hungry.   Planned snacks: 1-2 times per day and no more than 150 calories    Eat protein first   Protein helps with healing, maintaining adequate muscle mass, reducing hunger and optimizing nutritional status   Aim for 60-80 grams of protein per day   Fill up on Fiber   Fiber comes from plants--fruits, veggies, whole grains, nuts/seeds and beans   Fiber is low in calories, high in phytonutrients and helps you stay full longer   Aim for 25-35 grams per day by eating fiber with meals and snacks  Eat S-L-O-W-L-Y   Take 20-30 minutes to eat each meal by taking small bites, chewing foods to applesauce consistency or 20-30 times before you swallow   Eating foods too fast can delay satiety/fullness signals and increase overeating   Slow down your eating by using toddler utensils, putting your fork/spoon down between bites and not watching TV or emailing during " meals!   Keep a Journal         Writing down what you eat, how you feel and when you are active helps you identify new changes to work on from week to week         Look for ways to cut 100 calories from your current diet 2-3 times per day  Drink 64 ounces of 0-Calorie drinks between meals   Water   Zero calorie Propel  or Vitamin Water     SoBe Lifewater  Zero Calories   Crystal Light , Sugar-Free Jimmy-Aid , and other sugar-free lemonade or flavored vincent   Keep Caffeine to less than 300mg per day ie: 3-6oz cups coffee     Work up to 45-60 minutes of physical activity most days of the week   Helps with losing weight and prevent regaining those extra pounds!    Do a combo of cardio (walking/water exercises) and strength training (lifting weights/Vinyasa yoga)    Avoid Mindless Eating   Be present when you eat--take note of the smell, taste and quality of your food   Make a list of alternative activities you could do to prevent eating out of boredom/stress  Go for a walk, call a friend, chew gum, paint your nails, re-organize the garage, etc

## 2024-06-29 ENCOUNTER — HEALTH MAINTENANCE LETTER (OUTPATIENT)
Age: 34
End: 2024-06-29

## 2024-08-27 ENCOUNTER — MYC REFILL (OUTPATIENT)
Dept: SURGERY | Facility: CLINIC | Age: 34
End: 2024-08-27
Payer: COMMERCIAL

## 2024-08-27 DIAGNOSIS — E66.01 MORBID OBESITY (H): ICD-10-CM

## 2024-08-28 RX ORDER — SEMAGLUTIDE 1.7 MG/.75ML
1.7 INJECTION, SOLUTION SUBCUTANEOUS
Qty: 3 ML | Refills: 1 | Status: SHIPPED | OUTPATIENT
Start: 2024-08-28 | End: 2024-09-25

## 2024-08-28 NOTE — TELEPHONE ENCOUNTER
Pt last seen 6/25/24 and next 2 appts 10/16/24 and 1/25/25.  Will refill to get to next appt per clinic protocol.  Chapis Rainey, MS, RD, RN

## 2024-09-25 ENCOUNTER — MYC REFILL (OUTPATIENT)
Dept: SURGERY | Facility: CLINIC | Age: 34
End: 2024-09-25
Payer: COMMERCIAL

## 2024-09-25 DIAGNOSIS — E66.01 MORBID OBESITY (H): ICD-10-CM

## 2024-09-26 DIAGNOSIS — E66.01 MORBID OBESITY (H): Primary | ICD-10-CM

## 2024-09-26 RX ORDER — SEMAGLUTIDE 1.7 MG/.75ML
1.7 INJECTION, SOLUTION SUBCUTANEOUS
Qty: 3 ML | Refills: 0 | Status: SHIPPED | OUTPATIENT
Start: 2024-09-26

## 2024-09-28 ENCOUNTER — LAB (OUTPATIENT)
Dept: LAB | Facility: CLINIC | Age: 34
End: 2024-09-28
Payer: COMMERCIAL

## 2024-09-28 DIAGNOSIS — E66.01 MORBID OBESITY (H): ICD-10-CM

## 2024-09-28 LAB
ANION GAP SERPL CALCULATED.3IONS-SCNC: 11 MMOL/L (ref 7–15)
BUN SERPL-MCNC: 13.3 MG/DL (ref 6–20)
CALCIUM SERPL-MCNC: 9.3 MG/DL (ref 8.8–10.4)
CHLORIDE SERPL-SCNC: 107 MMOL/L (ref 98–107)
CREAT SERPL-MCNC: 0.87 MG/DL (ref 0.51–0.95)
EGFRCR SERPLBLD CKD-EPI 2021: 89 ML/MIN/1.73M2
GLUCOSE SERPL-MCNC: 85 MG/DL (ref 70–99)
HCO3 SERPL-SCNC: 20 MMOL/L (ref 22–29)
POTASSIUM SERPL-SCNC: 4.3 MMOL/L (ref 3.4–5.3)
SODIUM SERPL-SCNC: 138 MMOL/L (ref 135–145)

## 2024-09-28 PROCEDURE — 36415 COLL VENOUS BLD VENIPUNCTURE: CPT

## 2024-09-28 PROCEDURE — 80048 BASIC METABOLIC PNL TOTAL CA: CPT

## 2024-10-16 ENCOUNTER — TELEPHONE (OUTPATIENT)
Dept: SURGERY | Facility: CLINIC | Age: 34
End: 2024-10-16

## 2024-10-16 ENCOUNTER — OFFICE VISIT (OUTPATIENT)
Dept: SURGERY | Facility: CLINIC | Age: 34
End: 2024-10-16
Payer: COMMERCIAL

## 2024-10-16 VITALS
HEART RATE: 88 BPM | SYSTOLIC BLOOD PRESSURE: 116 MMHG | OXYGEN SATURATION: 99 % | BODY MASS INDEX: 42.03 KG/M2 | DIASTOLIC BLOOD PRESSURE: 84 MMHG | WEIGHT: 267.8 LBS | RESPIRATION RATE: 16 BRPM | HEIGHT: 67 IN

## 2024-10-16 DIAGNOSIS — E28.2 PCOS (POLYCYSTIC OVARIAN SYNDROME): ICD-10-CM

## 2024-10-16 DIAGNOSIS — E66.01 MORBID OBESITY (H): Primary | ICD-10-CM

## 2024-10-16 PROCEDURE — 99213 OFFICE O/P EST LOW 20 MIN: CPT | Performed by: PHYSICIAN ASSISTANT

## 2024-10-16 RX ORDER — SEMAGLUTIDE 2.4 MG/.75ML
2.4 INJECTION, SOLUTION SUBCUTANEOUS
Qty: 9 ML | Refills: 0 | Status: SHIPPED | OUTPATIENT
Start: 2024-10-16

## 2024-10-16 NOTE — TELEPHONE ENCOUNTER
PRIOR AUTHORIZATION DENIED    Medication: SEMAGLUTIDE-WEIGHT MANAGEMENT 2.4 MG/0.75ML SC SOAJ  Insurance Company: [employer] ([facility]) - Phone [phone] Fax [phone]  Denial Date: 10/16/2024  Denial Reason(s):     Appeal Information:     Patient Notified: No

## 2024-10-16 NOTE — PATIENT INSTRUCTIONS
"Nice to talk with you today! Thank you for allowing me the privilege of caring for you.   We hope we provided you with the excellent service you deserve.     To ensure the quality of our services you may receive a patient satisfaction survey from an independent monitoring company.  The greatest compliment you can give is \"Likely to Recommend\"      Below is our plan we discussed.-  SHAKA Ag      Continue wegovy    Scheduled for a follow up in 3 months.  If you need to reach me sooner you can do so by calling [phone].    Have a great day!       Eat Better ? Move More ? Live Well    Eat 3 nutrient-rich meals each day    Don t skip meals--it will cause you to overeat later in the day!    Eating fiber (vegetables/fruits/whole grains) and protein with meals helps you stay full longer    Choose foods with less than 10 grams of sugar and 5 grams of fat per serving to prevent excess calories and weight gain  Eat around the same times each day to develop a routine eating schedule   Avoid snacking unless physically hungry.   Planned snacks: 1-2 times per day and no more than 150 calories    Eat protein first   Protein helps with healing, maintaining adequate muscle mass, reducing hunger and optimizing nutritional status   Aim for 60-80 grams of protein per day   Fill up on Fiber   Fiber comes from plants--fruits, veggies, whole grains, nuts/seeds and beans   Fiber is low in calories, high in phytonutrients and helps you stay full longer   Aim for 25-35 grams per day by eating fiber with meals and snacks  Eat S-L-O-W-L-Y   Take 20-30 minutes to eat each meal by taking small bites, chewing foods to applesauce consistency or 20-30 times before you swallow   Eating foods too fast can delay satiety/fullness signals and increase overeating   Slow down your eating by using toddler utensils, putting your fork/spoon down between bites and not watching TV or emailing during meals!   Keep a Journal         Writing down what you " eat, how you feel and when you are active helps you identify new changes to work on from week to week         Look for ways to cut 100 calories from your current diet 2-3 times per day  Drink 64 ounces of 0-Calorie drinks between meals   Water   Zero calorie Propel  or Vitamin Water     SoBe Lifewater  Zero Calories   Crystal Light , Sugar-Free Jimmy-Aid , and other sugar-free lemonade or flavored vincent   Keep Caffeine to less than 300mg per day ie: 3-6oz cups coffee     Work up to 45-60 minutes of physical activity most days of the week   Helps with losing weight and prevent regaining those extra pounds!    Do a combo of cardio (walking/water exercises) and strength training (lifting weights/Vinyasa yoga)    Avoid Mindless Eating   Be present when you eat--take note of the smell, taste and quality of your food   Make a list of alternative activities you could do to prevent eating out of boredom/stress  Go for a walk, call a friend, chew gum, paint your nails, re-organize the garage, etc

## 2024-10-16 NOTE — PROGRESS NOTES
Return Medical Weight Management Note         Bettina Blackman  MRN:  [id number]  :  1990  BRANDY:  10/16/2024        Dear Ritika Armstrong MD,    I had the pleasure of seeing your patient Bettina Blackman. She is a 34 year old female who I am continuing to see for treatment of obesity related to:        3/21/2024     4:21 PM   --   I have the following health issues associated with obesity Polycystic Ovarian Syndrome    Fatty Liver   I have the following symptoms associated with obesity Back Pain    Irregular Menstral Cycle       Assessment   Problem List Items Addressed This Visit       Morbid obesity (H) - Primary    Relevant Medications    Semaglutide-Weight Management (WEGOVY) 2.4 MG/0.75ML pen    PCOS (polycystic ovarian syndrome)    Relevant Medications    Semaglutide-Weight Management (WEGOVY) 2.4 MG/0.75ML pen          PLAN/DISCUSSION:  Congratulated patient on overall weight loss and lifestyle changes. Emphasized the importance of diet and activity for long term success     Will increase wegovy to 2.4 mg dose to help with more appetite suppression. If not much difference can consider zepbound at next visit.     Patient will be using wegovy as an adjunct to a comprehensive weight management plan including a reduced calorie diet, increased physical activity and behavior modifications.     Not interested in surgery at this time.       FOLLOW-UP:  January with Agnes Duffy PA-C        SUBJECTIVE/OBJECTIVE:  Patient last seen 2024 and was continued on wegovy. Is now up to the 1.7 mg dose. Has been on for 2.5 months. Feels like should have lost more weight.   This summer has been busy. Promotion, new apartment and new car.  Stress was high but is improving. Continues to work out. Does not feel like she is overeating and is not much of a snacker.     Anti-obesity medications:   Current: Wegovy 1.7 mg on Wednesday  Side effects:  Denies  nausea, vomiting, abdominal pain, diarrhea, or heartburn            10/15/2024     3:43 PM   Weight Loss Medication History Reviewed With Patient   Which weight loss medications are you currently taking on a regular basis? Wegovy   Are you having any side effects from the weight loss medication that we have prescribed you? No       Lab on 09/28/2024   Component Date Value Ref Range Status    Sodium 09/28/2024 138  135 - 145 mmol/L Final    Potassium 09/28/2024 4.3  3.4 - 5.3 mmol/L Final    Chloride 09/28/2024 107  98 - 107 mmol/L Final    Carbon Dioxide (CO2) 09/28/2024 20 (L)  22 - 29 mmol/L Final    Anion Gap 09/28/2024 11  7 - 15 mmol/L Final    Urea Nitrogen 09/28/2024 13.3  6.0 - 20.0 mg/dL Final    Creatinine 09/28/2024 0.87  0.51 - 0.95 mg/dL Final    GFR Estimate 09/28/2024 89  >60 mL/min/1.73m2 Final    eGFR calculated using 2021 CKD-EPI equation.    Calcium 09/28/2024 9.3  8.8 - 10.4 mg/dL Final    Reference intervals for this test were updated on 7/16/2024 to reflect our healthy population more accurately. There may be differences in the flagging of prior results with similar values performed with this method. Those prior results can be interpreted in the context of the updated reference intervals.    Glucose 09/28/2024 85  70 - 99 mg/dL Final       Recent diet changes: Up at 6:30 - 7 am  B: at 8 am had eggs and fruit and half bread of sour dough  L: 1 pm had turkey, avocado, berries and Fairlife protein drink  D: Protein chili recipe with kidney beans, tomatoes  Snacks on almonds sometimes  Fluids:  oz water daily     Recent exercise/activity changes: Pilates 3-4 times weekly. Wanted more variety so just joined the [facility] for a change of pace. Walks 2-3 times weekly using gym at apartment     Recent stressors: improving      CURRENT WEIGHT:   267 lbs 12.8 oz    Initial Weight (lbs): 291 lbs  Last Visits Weight: 283 lb (128.4 kg)  Cumulative weight loss (lbs): 23.2  Weight Loss Percentage: 7.97%        10/15/2024     3:43 PM   Changes and Difficulties   I  "have made the following changes to my diet since my last visit: Eating more beans, natural sources of fiber   With regards to my diet, I am still struggling with: Eating times, occasional binging   I have made the following changes to my activity/exercise since my last visit: Less exercise   With regards to my activity/exercise, I am still struggling with: Decreased motivation         MEDICATIONS:   Current Outpatient Medications   Medication Sig Dispense Refill    Semaglutide-Weight Management (WEGOVY) 1.7 MG/0.75ML pen Inject 1.7 mg subcutaneously every 7 days. 3 mL 0    Semaglutide-Weight Management (WEGOVY) 2.4 MG/0.75ML pen Inject 2.4 mg subcutaneously every 7 days. 9 mL 0    Semaglutide-Weight Management (WEGOVY) 0.25 MG/0.5ML pen Inject 0.25 mg Subcutaneous every 7 days (Patient not taking: Reported on 10/16/2024) 2 mL 0    Semaglutide-Weight Management (WEGOVY) 0.5 MG/0.5ML pen Inject 0.5 mg Subcutaneous every 7 days (Patient not taking: Reported on 10/16/2024) 2 mL 1    Semaglutide-Weight Management (WEGOVY) 1 MG/0.5ML pen Inject 1 mg Subcutaneous every 7 days (Patient not taking: Reported on 10/16/2024) 2 mL 1         ROS:  General  Fatigue: No  Sleep Quality: OK      PHYSICAL EXAM:  Objective    /84   Pulse 88   Resp 16   Ht 5' 7\" (1.702 m)   Wt 267 lb 12.8 oz (121.5 kg)   SpO2 99%   BMI 41.94 kg/m    GENERAL: Healthy, alert and no distress  EYES: Eyes grossly normal to inspection.  No discharge or erythema, or obvious scleral/conjunctival abnormalities.  RESP: No audible wheeze, cough, or visible cyanosis.  No visible retractions or increased work of breathing.    SKIN: Visible skin clear. No significant rash, abnormal pigmentation or lesions.  NEURO: Cranial nerves grossly intact.  Mentation and speech appropriate for age.  PSYCH: Mentation appears normal, affect normal/bright, judgement and insight intact, normal speech and appearance well-groomed.        Sincerely,    Kimberli Beckwith, " SHAKA    23 minutes spent on the date of the encounter doing chart review, review of test results, patient visit and documentation

## 2024-10-17 NOTE — TELEPHONE ENCOUNTER
"     [facility] [place]  [address]  [address]  [place] [address]  Phone: [phone]  Fax: [phone]       10/17/2024    To:   [facility] [place]    RE: Bettina Blackman   [place] [address] [address] [place] [address]  : 1990  MRN: [id number]  Policy #: [id number]     To Whom It May Concern,    I am writing on behalf of my patient, Bettina Blackman to document the medical necessity of Wegovy for the treatment of Obesity. This letter provides information about the patient's medical history and diagnosis and a statement summarizing my treatment rationale.     Summary of Patient History and Diagnosis  Bettina Blackman is a 34 year old female with a diagnosis of obesity (BMI at least 30 kg/m ). Patient has been working with [facility] with Kimberli Beckwith PA-C, and a team of dietitians.      Current Weight and BMI - Currently taking GLP-1 medication   Estimated body mass index is 41.94 kg/m  as calculated from the following:    Height as of an earlier encounter on 10/16/2024: 5' 7\".    Weight as of an earlier encounter on 10/16/2024: 267 lbs.    Beginning Weight and BMI - GLP-1 medication started  Estimated body mass index is 45.58 kg/m  as calculated from the following:    Height as of an earlier encounter on 3/22/2024: 5' 7\".    Weight as of an earlier encounter on 3/22/2024: 291 lbs.    Bettina has lost 24lbs (8.2% body weight) and is maintaining this weight loss.  She continues to follow-up with a specialty weight loss provider and dietician.  She will continue to be using Wegovy as an adjunct to a Comprehensive Weight Management plan including a reduced calorie diet, increased physical activity and behavior modifications.     Recent diet changes: Up at 6:30 - 7 am  B: at 8 am had eggs and fruit and half bread of sour dough  L: 1 pm had turkey, avocado, berries and Fairlife protein drink  D: Protein " chili recipe with kidney beans, tomatoes  Snacks on almonds sometimes    Fluids:  oz water daily      Recent exercise/activity changes: Pilates 3-4 times weekly. Wanted more variety so just joined the [employer] for a change of pace. Walks 2-3 times weekly using gym at apartment      Recent stressors:  improving    Treatment Rationale  Patient has tried and failed the following non-pharmacological options and/or medications to achieve successful weight loss:  diet and lifestyle changes for 7 months  - from 3/22/24 till present.    The patient was followed by dietitian and provider during this time.   Patient cannot use the following medications due to contraindications or use would produce potential patient harm:     Phentermine: Not currently a safe option due to elevated BP  Topiramate: Concerns for no current birth control in use    The patient should receive Prior Authorization approval for Wegovy because of the above information and patient qualifies for use of weight loss medication(s) because said patient has a BMI of at least 30 kg/m  and has no contraindications for use of Wegovy.   Patient will not be using Wegovy with any other weight loss medication(s).     It is the position of the Obesity Medicine Association (THERESA) that:   Obesity is a chronic disease that worsens over time. Weight loss does not cure obesity - instead, it triggers adaptations that restore the lost weight. [ii] It is estimated that 85% of self-directed weight loss efforts are met by weight regain. Pharmacotherapy has been shown to be both safe and effective. It doubles to triples the odds of losing 5-10% body weight (or more), and also the odds of keeping that weight off. Unfortunately, if treatment is stopped, weight is regained. [iii]    It is the position of the Obesity Medicine Association (THERESA) that:  Long-term pharmacotherapy represents one important evidence-based treatment strategy for obesity.  The current standard of care with  respect to obesity pharmacotherapy is that medications should be prescribed long-term.   Short-term use of obesity pharmacotherapy is not recommended as it has not been proven to provide a benefit    Duration  12 months    Summary  In summary, Wegovy is medically necessary for this patient s medical condition. Please call my office at [phone] if I can provide you with any additional information to approve my request. I look forward to receiving your timely response and approval of this request.     Sincerely,    SHAKA Zhang [facility]    RLF/zheng

## 2024-10-25 NOTE — TELEPHONE ENCOUNTER
Medication Appeal Initiation    Medication: SEMAGLUTIDE-WEIGHT MANAGEMENT 2.4 MG/0.75ML SC SOAJ  Appeal Start Date:  10/25/2024  Insurance Company: [employer] ([facility]) - Phone [phone] Fax [phone]   Insurance Phone:   Insurance Fax: [phone]  Comments:

## 2024-10-29 NOTE — TELEPHONE ENCOUNTER
MEDICATION APPEAL APPROVED    Medication: SEMAGLUTIDE-WEIGHT MANAGEMENT 2.4 MG/0.75ML SC SOAJ  Authorization Effective Date: 10/16/2024  Authorization Expiration Date: 4/25/2025  Approved Dose/Quantity:   Reference #: [id number]   Appeal Insurance Company: [employer] ([facility]) - Phone [phone] Fax [phone]   Expected CoPay: $       CoPay Card Available:    Financial Assistance Needed:   Filling Pharmacy: [facility]  [address] [place] [address]  Comments:

## 2024-11-30 SDOH — HEALTH STABILITY: PHYSICAL HEALTH: ON AVERAGE, HOW MANY MINUTES DO YOU ENGAGE IN EXERCISE AT THIS LEVEL?: 30 MIN

## 2024-11-30 SDOH — HEALTH STABILITY: PHYSICAL HEALTH: ON AVERAGE, HOW MANY DAYS PER WEEK DO YOU ENGAGE IN MODERATE TO STRENUOUS EXERCISE (LIKE A BRISK WALK)?: 5 DAYS

## 2024-11-30 ASSESSMENT — SOCIAL DETERMINANTS OF HEALTH (SDOH): HOW OFTEN DO YOU GET TOGETHER WITH FRIENDS OR RELATIVES?: THREE TIMES A WEEK

## 2024-12-05 ENCOUNTER — OFFICE VISIT (OUTPATIENT)
Dept: FAMILY MEDICINE | Facility: CLINIC | Age: 34
End: 2024-12-05
Payer: COMMERCIAL

## 2024-12-05 VITALS
WEIGHT: 268.2 LBS | BODY MASS INDEX: 42.09 KG/M2 | HEART RATE: 86 BPM | SYSTOLIC BLOOD PRESSURE: 127 MMHG | TEMPERATURE: 97.2 F | OXYGEN SATURATION: 97 % | RESPIRATION RATE: 20 BRPM | HEIGHT: 67 IN | DIASTOLIC BLOOD PRESSURE: 85 MMHG

## 2024-12-05 DIAGNOSIS — Z12.4 CERVICAL CANCER SCREENING: ICD-10-CM

## 2024-12-05 DIAGNOSIS — Z00.00 ANNUAL PHYSICAL EXAM: Primary | ICD-10-CM

## 2024-12-05 DIAGNOSIS — T88.7XXA MEDICATION SIDE EFFECT: ICD-10-CM

## 2024-12-05 DIAGNOSIS — B35.1 ONYCHOMYCOSIS: ICD-10-CM

## 2024-12-05 DIAGNOSIS — E66.01 MORBID OBESITY (H): ICD-10-CM

## 2024-12-05 DIAGNOSIS — E66.01 MORBID OBESITY (H): Primary | ICD-10-CM

## 2024-12-05 DIAGNOSIS — E28.2 PCOS (POLYCYSTIC OVARIAN SYNDROME): ICD-10-CM

## 2024-12-05 DIAGNOSIS — K76.0 HEPATIC STEATOSIS: ICD-10-CM

## 2024-12-05 LAB
ALBUMIN SERPL BCG-MCNC: 4.4 G/DL (ref 3.5–5.2)
ALP SERPL-CCNC: 82 U/L (ref 40–150)
ALT SERPL W P-5'-P-CCNC: 20 U/L (ref 0–50)
AST SERPL W P-5'-P-CCNC: 17 U/L (ref 0–45)
BILIRUB DIRECT SERPL-MCNC: 0.23 MG/DL (ref 0–0.3)
BILIRUB SERPL-MCNC: 1 MG/DL
CHOLEST SERPL-MCNC: 155 MG/DL
ERYTHROCYTE [DISTWIDTH] IN BLOOD BY AUTOMATED COUNT: 11.9 % (ref 10–15)
FASTING STATUS PATIENT QL REPORTED: NO
HCT VFR BLD AUTO: 40 % (ref 35–47)
HDLC SERPL-MCNC: 49 MG/DL
HGB BLD-MCNC: 13.1 G/DL (ref 11.7–15.7)
LDLC SERPL CALC-MCNC: 85 MG/DL
MCH RBC QN AUTO: 28.1 PG (ref 26.5–33)
MCHC RBC AUTO-ENTMCNC: 32.8 G/DL (ref 31.5–36.5)
MCV RBC AUTO: 86 FL (ref 78–100)
NONHDLC SERPL-MCNC: 106 MG/DL
PLATELET # BLD AUTO: 271 10E3/UL (ref 150–450)
PROT SERPL-MCNC: 7.5 G/DL (ref 6.4–8.3)
RBC # BLD AUTO: 4.67 10E6/UL (ref 3.8–5.2)
TRIGL SERPL-MCNC: 105 MG/DL
WBC # BLD AUTO: 7.2 10E3/UL (ref 4–11)

## 2024-12-05 RX ORDER — TERBINAFINE HYDROCHLORIDE 250 MG/1
250 TABLET ORAL DAILY
Qty: 90 TABLET | Refills: 0 | Status: SHIPPED | OUTPATIENT
Start: 2024-12-05 | End: 2025-03-05

## 2024-12-05 ASSESSMENT — PAIN SCALES - GENERAL: PAINLEVEL_OUTOF10: NO PAIN (0)

## 2024-12-05 NOTE — PROGRESS NOTES
"Preventive Care Visit  [facility] ENOC Armstrong MD, Internal Medicine  Dec 5, 2024      Assessment & Plan     Annual physical exam  - Lipid Profile  - CBC with Platelets (Today)    Cervical cancer screening  - HPV and Gynecologic Cytology Panel - Recommended Age 30-65 Years    Medication side effect  - Hepatic panel (Albumin, ALT, AST, Bili, Alk Phos, TP); Standing  - Hepatic panel (Albumin, ALT, AST, Bili, Alk Phos, TP)    Onychomycosis  Starting terbinafine for bilateral toenail fungus.   Will monitor liver function.  - terbinafine (LAMISIL) 250 MG tablet; Take 1 tablet (250 mg) by mouth daily.    PCOS (polycystic ovarian syndrome)  Patient will follow up with her Gyn    Morbid obesity (H)  Currently on wegovy    Hepatic steatosis  Follow up ultrasound  - US Abdomen Limited; Future    Patient has been advised of split billing requirements and indicates understanding: Yes        BMI  Estimated body mass index is 42.64 kg/m  as calculated from the following:    Height as of this encounter: 1.689 m (5' 6.5\").    Weight as of this encounter: 121.7 kg (268 lb 3.2 oz).   Weight management plan: Discussed healthy diet and exercise guidelines    Counseling  Appropriate preventive services were addressed with this patient via screening, questionnaire, or discussion as appropriate for fall prevention, nutrition, physical activity, Tobacco-use cessation, social engagement, weight loss and cognition.  Checklist reviewing preventive services available has been given to the patient.  Reviewed patient's diet, addressing concerns and/or questions.       See Patient Instructions    Lucas Dunbar is a 34 year old, presenting for the following:  Establish Care (Patient is here to establish care with Primary care provider.) and Physical (Patient is here for annual preventative physical.)        12/5/2024     8:20 AM   Additional Questions   Roomed by Sandeep FOSTER MA   Accompanied by Self          HPI  Bettina " Angélica Blackman is here for APE    She is going to weight loss clinic for medication weight management. She is on wegovy 2.4 mg.   She has hx of hepatic steatosis.  She has PCOS and has symptoms of irregular periods, hirsutism and skin tags.   She has onychomycosis and has tried many topical agents without relief.   She is due for cervical cancer screening but she has never been sexually active. Attempted speculum insertion but did not complete the test due to pain.   She is UTD with vaccines      Health Care Directive  Patient does not have a Health Care Directive: Discussed advance care planning with patient; information given to patient to review.      11/30/2024   General Health   How would you rate your overall physical health? (!) FAIR   Feel stress (tense, anxious, or unable to sleep) Only a little      (!) STRESS CONCERN      11/30/2024   Nutrition   Three or more servings of calcium each day? Yes   Diet: Regular (no restrictions)   How many servings of fruit and vegetables per day? (!) 2-3   How many sweetened beverages each day? 0-1            11/30/2024   Exercise   Days per week of moderate/strenous exercise 5 days   Average minutes spent exercising at this level 30 min            11/30/2024   Social Factors   Frequency of gathering with friends or relatives Three times a week   Worry food won't last until get money to buy more No   Food not last or not have enough money for food? No   Do you have housing? (Housing is defined as stable permanent housing and does not include staying ouside in a car, in a tent, in an abandoned building, in an overnight shelter, or couch-surfing.) Yes   Are you worried about losing your housing? No   Lack of transportation? No   Unable to get utilities (heat,electricity)? No            11/30/2024   Dental   Dentist two times every year? Yes            11/30/2024   TB Screening   Were you born outside of the [place]? No        Today's PHQ-2 Score:       5/22/2024     9:34 AM   PHQ-2  "( 1999 Pfizer)   Q1: Little interest or pleasure in doing things 0   Q2: Feeling down, depressed or hopeless 0   PHQ-2 Score 0         11/30/2024   Substance Use   Alcohol more than 3/day or more than 7/wk No   Do you use any other substances recreationally? No        Social History     Tobacco Use    Smoking status: Never    Smokeless tobacco: Never   Vaping Use    Vaping status: Never Used   Substance Use Topics    Alcohol use: Yes     Comment: Occasionally    Drug use: No        Mammogram Screening - Patient under 40 years of age: Routine Mammogram Screening not recommended.           11/30/2024   One time HIV Screening   Previous HIV test? No          11/30/2024   STI Screening   New sexual partner(s) since last STI/HIV test? No        History of abnormal Pap smear: No - under age 21, PAP not appropriate for age        1/4/2013     9:35 AM   PAP / HPV   PAP (Historical) NIL            11/30/2024   Contraception/Family Planning   Questions about contraception or family planning No        Reviewed and updated as needed this visit by Provider                       Objective    Exam  /85 (BP Location: Right arm, Patient Position: Sitting, Cuff Size: Adult Large)   Pulse 86   Temp 97.2  F (36.2  C) (Temporal)   Resp 20   Ht 1.689 m (5' 6.5\")   Wt 121.7 kg (268 lb 3.2 oz)   LMP 11/19/2024 (Exact Date)   SpO2 97%   BMI 42.64 kg/m     Estimated body mass index is 42.64 kg/m  as calculated from the following:    Height as of this encounter: 1.689 m (5' 6.5\").    Weight as of this encounter: 121.7 kg (268 lb 3.2 oz).    Physical Exam  Vitals reviewed.   Constitutional:       Appearance: Normal appearance.   HENT:      Right Ear: Tympanic membrane normal. There is no impacted cerumen.      Left Ear: Tympanic membrane normal. There is no impacted cerumen.      Mouth/Throat:      Mouth: Mucous membranes are moist.      Pharynx: Oropharynx is clear. No oropharyngeal exudate or posterior oropharyngeal erythema. "   Cardiovascular:      Rate and Rhythm: Normal rate and regular rhythm.      Heart sounds: Normal heart sounds. No murmur heard.     No gallop.   Pulmonary:      Effort: Pulmonary effort is normal. No respiratory distress.      Breath sounds: Normal breath sounds. No stridor. No wheezing, rhonchi or rales.   Chest:   Breasts:     Right: No swelling, bleeding, inverted nipple, mass, nipple discharge, skin change or tenderness.      Left: No swelling, bleeding, inverted nipple, mass, nipple discharge, skin change or tenderness.   Abdominal:      General: Abdomen is flat. There is no distension.      Palpations: Abdomen is soft. There is no mass.      Tenderness: There is no abdominal tenderness. There is no guarding.      Hernia: No hernia is present.   Musculoskeletal:         General: Normal range of motion.      Cervical back: Normal range of motion and neck supple. No rigidity or tenderness.      Right lower leg: No edema.      Left lower leg: No edema.   Lymphadenopathy:      Cervical: No cervical adenopathy.   Skin:     General: Skin is warm and dry.   Neurological:      General: No focal deficit present.      Mental Status: She is alert.   Psychiatric:         Mood and Affect: Mood normal.               Signed Electronically by: Ritika Armstrong MD

## 2024-12-09 ENCOUNTER — HOSPITAL ENCOUNTER (OUTPATIENT)
Dept: ULTRASOUND IMAGING | Facility: CLINIC | Age: 34
Discharge: HOME | End: 2024-12-09
Attending: INTERNAL MEDICINE | Admitting: INTERNAL MEDICINE
Payer: COMMERCIAL

## 2024-12-09 DIAGNOSIS — K76.0 HEPATIC STEATOSIS: ICD-10-CM

## 2024-12-09 PROCEDURE — 76705 ECHO EXAM OF ABDOMEN: CPT

## 2025-01-13 ENCOUNTER — OFFICE VISIT (OUTPATIENT)
Dept: SURGERY | Facility: CLINIC | Age: 35
End: 2025-01-13
Payer: COMMERCIAL

## 2025-01-13 VITALS — WEIGHT: 267.6 LBS | BODY MASS INDEX: 42 KG/M2 | HEIGHT: 67 IN

## 2025-01-13 DIAGNOSIS — K76.0 HEPATIC STEATOSIS: ICD-10-CM

## 2025-01-13 DIAGNOSIS — E66.01 CLASS 3 SEVERE OBESITY WITH BODY MASS INDEX (BMI) OF 40.0 TO 44.9 IN ADULT, UNSPECIFIED OBESITY TYPE, UNSPECIFIED WHETHER SERIOUS COMORBIDITY PRESENT (H): Primary | ICD-10-CM

## 2025-01-13 DIAGNOSIS — E28.2 PCOS (POLYCYSTIC OVARIAN SYNDROME): ICD-10-CM

## 2025-01-13 DIAGNOSIS — E66.813 CLASS 3 SEVERE OBESITY WITH BODY MASS INDEX (BMI) OF 40.0 TO 44.9 IN ADULT, UNSPECIFIED OBESITY TYPE, UNSPECIFIED WHETHER SERIOUS COMORBIDITY PRESENT (H): Primary | ICD-10-CM

## 2025-01-13 PROCEDURE — 99213 OFFICE O/P EST LOW 20 MIN: CPT | Performed by: PHYSICIAN ASSISTANT

## 2025-01-13 PROCEDURE — G2211 COMPLEX E/M VISIT ADD ON: HCPCS | Performed by: PHYSICIAN ASSISTANT

## 2025-01-13 NOTE — PATIENT INSTRUCTIONS
Nice to talk with you today. Below is the plan discussed.-  Agnes Duffy PA-C     Pt Instructions:  Let's increase to 10 mg Zepbound weekly after this month of 7.5 mg (let me know on MyChart if wanting to stay at 7.5 mg weekly for refills or if you feel that you're tolerating the 10 mg dose well after a month but feel it could be more effective between visits).    See the dietitian for diet recommendations.     Goals:  Focus on healthy food choices - prioritizing protein and veggies in your meals. I recommend eating every 4-6 hours to reduce the risk of low blood sugars and try to minimize snacking between meals. Stay well hydrated though the day as well.  Great job with exercising - please continue to invest in yourself with regular exercise. Continue to strive for a range for 150-300 minutes of exercise for weight maintenance and incorporating strength training twice-three times a week to help preserve muscle!      Follow up:    Call [phone] to schedule next visit in next available. Be in touch on Mychart for refills/concerns between visits    Zepbound (Tirzepatide)    Zepbound (Tirzepatide): is an injectable prescription medicine recently FDA approved for adults with obesity or overweight with an additional condition affected by their weight.      It is given as a shot once a week. It activates the body's receptors for GIP (glucose-dependent insulinotropic polypeptide) and GLP-1 (glucagon-like peptide-1) receptor, two naturally occurring hormones that help tell the brain that you are full. It also works is by slowing down how quickly food leaves your stomach.     You will start to feel hernandez more quickly, notice portion changes and eat less often between meals.  Patients are advised to eat slowly and purposefully. Give yourself less portions. You may find after starting this medication you have a new point of fullness. Maintain consistent eating schedule with meals +/- snacks and get in good  hydration.    Dosing:  Initial dose: 2.5 mg injected subcutaneously once weekly for 4 weeks  Further dose changes can include: increase to 5 mg once weekly for 4 weeks, then 7.5 mg once weekly for 4 weeks, then 10 mg once weekly for 4 weeks then 12.5 mg once weekly for 4 weeks, then 15 mg (maximum dose) once weekly.    Dose changes are based on how you are tolerating the current dose, what benefits you are seeing at the current dose and if improvement in effectiveness of the drug is needed. The goal is to find the dose that works best for you with the least amount of side effects. You may find you reach this at a lower dose than the maximum dose.     Side effects: Common side effects include nausea, Heartburn,diarrhea, constipation. This is worse when starting the medication and with dose dose escalation.  It does improve with time. Stay adequately hydrated and eat small portions to decrease the risk of side effects.     The risk of pancreatitis (inflammation of the pancreas) has been associated with this type of medication, but is very rare.  If you have had pancreatitis in the past, this medication may not be for you. If you experience persistent severe abdominal pain (sometimes radiating to the back potentially accompanied by vomiting and have a fever), stop the medication and contact your provider immediately for assessment. They will do a blood test to check for pancreatitis.       Caution:   Tirzepatide (Zepbound, Mounjaro) May decrease effectiveness of your oral birth control while starting and with dose adjustments.  Use backup forms of birth control if necessary.      For any questions or concerns please send a GoWork[employer] message to our team or call our weight management call center at [phone] during regular business hours.     There is a small chance you may have some low blood sugar after taking the medication.   The signs of low blood sugar are:  Weakness  Shaky   Hungry  Sweating  Confusion      See  below for ways to treat low blood sugar without adding in lots of extra calories.      Treating Low Blood Sugar    If you have symptoms of low blood sugar (sweating, shaking, dizzy, confused) eat 15 grams of carbs and wait 15 minutes:    Glucose Tabs are best for sugars under 70 -  Dex4 or BD Glucose tablets are good, you will need to take 3-4 of these to equal 15 grams.     One small box of raisins  4 oz fruit juice box or   cup fruit juice  1 small apple  1 small banana    cup canned fruit in water    English muffin or a slice of bread with jelly   1 low fat frozen waffle with sugar-free syrup    cup cottage cheese with   cup frozen or fresh blueberries  1 cup skim or low-fat milk    cup whole grain cereal  4-6 crackers such as Triscuits      This medication is usually not covered by insurance and can be quite expensive. Sometimes a prior authorization is required, which may take up to 1-2 weeks for an insurance company to make a decision if they will cover the medication. Please be patient, you will be notified after a decision has been made.    Contact the nurse via [employer] or call [phone] if you have any questions or concerns. (Do not stop taking it if you don't think it's working. For some people it works without them knowing it.)     In order to get refills of this or any medication we prescribe you must be seen in the medical weight mgmt clinic every 2-4 months.        50 Things to do instead of Snacking    Imagine the new healthier you  26. Drink a glass of water  Walk around the block   27. Kiss someone  Call a friend     28. Try on some of your clothes  List  10 reasons to lose weight   29. Look at old pictures  Make a To Do list    30. Rent a video  Turn on music and Dance  31. Wash your car  Jot a thank you note for someone 32. Take a hot, soothing bath  Go to bed early or take a nap  33. Update your calendar  Read a book     34. Work in your yard  Blog or journal    35. Start your holiday shopping  list  Give yourself a manicure/pedicure 36. Count your blessings  Plan a healthy meal for your [id number]. Write a letter  Surf the internet   38. Fold some laundry  Finish an unfinished project  39. Check your e-mail  Walk your dog, pet your cat  40. Give your dog a bath  Brush your teeth   41. Send a birthday card  Balance your checkbook  42. Meditate  Say a prayer    43. Hug someone  Chop veggies to keep on hand 44. Rearrange some furniture  Give a massage    45. Light a fire or some candles  Clean out a junk drawer  46. Put your pictures in an album  Play a game with your kids  47. Plan a trip (real or imaginary)  Try a new route on your walk 48. Straighten a closet  Clean out files    49. Clean out your trunk  Visit a friend    50. Do something nice for someone

## 2025-01-13 NOTE — ASSESSMENT & PLAN NOTE
Patient was congratulated on wt loss success thus far. Healthy habits to assist with further weight loss were discussed including muscle conservation. Recommend connecting with the dietitian team. Recommend increasing to 10 mg Zepbound weekly after finishing this month of 7.5 mg weekly and be in touch on MyChart between visits for refills/dose changes.

## 2025-01-13 NOTE — PROGRESS NOTES
2025      Return Medical Weight Management Note     Bettina Blackman  MRN:  [id number]  :  1990    Assessment & Plan   Problem List Items Addressed This Visit       PCOS (polycystic ovarian syndrome)    Relevant Medications    tirzepatide-Weight Management (ZEPBOUND) 10 MG/0.5ML prefilled pen    Hepatic steatosis    Relevant Medications    tirzepatide-Weight Management (ZEPBOUND) 10 MG/0.5ML prefilled pen    Class 3 severe obesity with body mass index (BMI) of 40.0 to 44.9 in adult, unspecified obesity type, unspecified whether serious comorbidity present (H) - Primary     Patient was congratulated on wt loss success thus far. Healthy habits to assist with further weight loss were discussed including muscle conservation. Recommend connecting with the dietitian team. Recommend increasing to 10 mg Zepbound weekly after finishing this month of 7.5 mg weekly and be in touch on MyChart between visits for refills/dose changes.           Relevant Medications    tirzepatide-Weight Management (ZEPBOUND) 10 MG/0.5ML prefilled pen          PATIENT INSTRUCTIONS:  Pt Instructions:  Let's increase to 10 mg Zepbound weekly after this month of 7.5 mg (let me know on MyChart if wanting to stay at 7.5 mg weekly for refills or if you feel that you're tolerating the 10 mg dose well after a month but feel it could be more effective between visits).    See the dietitian for diet recommendations.     Goals:  Focus on healthy food choices - prioritizing protein and veggies in your meals. I recommend eating every 4-6 hours to reduce the risk of low blood sugars and try to minimize snacking between meals. Stay well hydrated though the day as well.  Great job with exercising - please continue to invest in yourself with regular exercise. Continue to strive for a range for 150-300 minutes of exercise for weight maintenance and incorporating strength training twice-three times a week to help preserve muscle!      Follow up:     Call [phone] to schedule next visit in next available. Be in touch on Mychart for refills/concerns between visits    24 minutes spent on the date of the encounter doing chart review, history and exam, result review, counseling, developing plan of care, documentation, and further activities as noted      INTERVAL HISTORY:  Bettina returns for medical weight management follow up.  Last seen on 10/16/2024 by Kimberli Beckwith PA-C with plan to continue  Wegovy.    Was having nausea/vomiting with Wegovy - due to insurance wasn't able to start Zepbound until recently. First week - didn't notice much difference.     No nausea with Zepbound and changed injection site to the thigh. Also noting a weight plateau. Discussed would continue to work with Zepbound, pending response - could consider adding orals (briefly touched on naltrexone that can help w/cravings). Would refrain from adding right now while titrating Zepbound so can monitor for SE w/Zepbound.     WEIGHT METRICS:  Body mass index is 41.91 kg/m .   Current Weight: 267 lb 9.6 oz (121.4 kg)  Last Visits Weight: 267 lb 12.8 oz (121.5 kg)  Initial Weight (lbs): 291 lbs  Cumulative weight loss (lbs): 23.4  Weight Loss Percentage: 8.04%    Wt Readings from Last 10 Encounters:   01/13/25 267 lb 9.6 oz (121.4 kg)   12/05/24 268 lb 3.2 oz (121.7 kg)   10/16/24 267 lb 12.8 oz (121.5 kg)   06/25/24 283 lb (128.4 kg)   03/22/24 292 lb 12.8 oz (132.8 kg)   03/22/24 291 lb (132 kg)   01/18/24 293 lb (132.9 kg)   04/27/21 280 lb (127 kg)   08/28/18 285 lb (129.3 kg)   08/24/18 286 lb (129.7 kg)                 1/12/2025     9:29 PM   Weight Loss Medication History Reviewed With Patient   Are you having any side effects from the weight loss medication that we have prescribed you? No           1/12/2025     9:29 PM   Changes and Difficulties   With regards to my diet, I am still struggling with: Sugar cravings/sweets   I have made the following changes to my activity/exercise  "since my last visit: Increased pilates classes, strength training     Getting three meals daily    Getting protein in - interested in connecting with dietitian again. Discussed surgery - pt reports not interested in bariatric surgery at this time.     LABS:  Labs reviewed in Epic    9/28/2024 BMP CO2 20 otherwise normal    BP Readings from Last 6 Encounters:   12/05/24 127/85   10/16/24 116/84   06/25/24 126/84   02/28/24 134/85   01/18/24 (!) 144/90   01/17/24 (!) 145/94       Pulse Readings from Last 6 Encounters:   12/05/24 86   10/16/24 88   06/25/24 72   02/28/24 62   01/18/24 70   01/17/24 74       PE:  Ht 5' 7\" (1.702 m)   Wt 267 lb 9.6 oz (121.4 kg)   LMP 11/19/2024 (Exact Date)   BMI 41.91 kg/m    GENERAL: Healthy, alert and no distress  EYES: Eyes grossly normal to inspection.    RESP: No audible wheeze, cough, or visible cyanosis.  No increased work of breathing.    SKIN: Visible skin clear. No significant rash, abnormal pigmentation or lesions.  NEURO: Mentation and speech appropriate for age.  PSYCH: Mentation appears normal, affect normal/bright, judgement and insight intact, normal speech and appearance well-groomed.      Sincerely,      Agnes Duffy PA-C       "

## 2025-01-14 ENCOUNTER — VIRTUAL VISIT (OUTPATIENT)
Dept: SURGERY | Facility: CLINIC | Age: 35
End: 2025-01-14
Payer: COMMERCIAL

## 2025-01-14 VITALS — HEIGHT: 67 IN | WEIGHT: 267.6 LBS | BODY MASS INDEX: 42 KG/M2

## 2025-01-14 DIAGNOSIS — E66.813 CLASS 3 SEVERE OBESITY WITH BODY MASS INDEX (BMI) OF 40.0 TO 44.9 IN ADULT, UNSPECIFIED OBESITY TYPE, UNSPECIFIED WHETHER SERIOUS COMORBIDITY PRESENT (H): Primary | ICD-10-CM

## 2025-01-14 DIAGNOSIS — E66.01 CLASS 3 SEVERE OBESITY WITH BODY MASS INDEX (BMI) OF 40.0 TO 44.9 IN ADULT, UNSPECIFIED OBESITY TYPE, UNSPECIFIED WHETHER SERIOUS COMORBIDITY PRESENT (H): Primary | ICD-10-CM

## 2025-01-14 PROCEDURE — 97803 MED NUTRITION INDIV SUBSEQ: CPT | Mod: 95 | Performed by: DIETITIAN, REGISTERED

## 2025-01-14 NOTE — PATIENT INSTRUCTIONS
"Mark Gironl!      It was great chatting with you again today! Here's a summary of the goals we discussed:    1. Increase protein at breakfast  - Aim for about 20-30g at each meal, especially breakfast  - Try adding a side of protein to your oatmeal (ie milk, protein drinks, yogurt, eggs, cottage cheese low-fat cheese stick, etc)     2. Increase fiber at lunch by including a vegetable  - raw or cooked is fine!    3. Have an afternoon snack  - See \"Easy Foods\" list below for ideas      Plan on following up in 4-5 months, or closer to 2-3 months if you're not seeing a lot of progress. This can be scheduled via our call center at . Reach out sooner with any questions or concerns. Have a great day!      Deanna Pham RD, LD  Clinical Dietitian         Easy Foods (by group)      Meals: pick 1 item from 3-4 food groups  Snacks: pick 1 item from 1-2 food groups     Protein:  yogurt  cottage cheese  low-fat cheese sticks/string cheese  lean deli meat (ie: chicken, turkey or low-fat ham)  lean beef or turkey jerky  pre-cooked grilled chicken breast  hard boiled eggs  nuts/seeds (in moderation - up to 1 serving [1/4c] per day)  protein shakes/bars (ie:  Premier Protein )  low-fat milk   edamame  chickpeas  rotisserie chicken   frozen veggie/black bean burgers  canned tuna or chicken         Vegetables:  baby carrots  celery sticks  sugar snap peas  cherry tomatoes  sliced bell peppers  sliced cucumber  chopped broccoli or cauliflower  raw string beans     Fruit:  grapes  berries  apples  pears  bananas  peaches  nectarines  plums  kiwi  pineapple        Starches:  whole wheat jaya bread ( Jona s )  high-fiber tortillas ( [place] Carb Balance )  Bean-based tortilla chips ( Beanitos )  Whole wheat crackers ( Triscuits )  Whole wheat bread  Dried/roasted beans/lentils ( Bada Bean Bada Boom  dried Mitul beans - available online or in some stores)  roasted chickpeas  high-fiber cereal (Fiber One, Kashi, Shredded Wheat, " Grape Nuts, etc)  air-popped popcorn (1-2 cups)

## 2025-01-14 NOTE — PROGRESS NOTES
"Bettina is a 34 year old who is being evaluated via a billable video visit.      The patient has been notified of following:     \"This video visit will be conducted via a call between you and your physician/provider. We have found that certain health care needs can be provided without the need for an in-person physical exam.  This service lets us provide the care you need with a video conversation.  If a prescription is necessary we can send it directly to your pharmacy.  If lab work is needed we can place an order for that and you can then stop by our lab to have the test done at a later time.    Video visits are billed at different rates depending on your insurance coverage.  Please reach out to your insurance provider with any questions.    If during the course of the call the physician/provider feels a video visit is not appropriate, you will not be charged for this service.\"    Patient has given verbal consent for Video visit? Yes    How would you like to obtain your AVS? MyChart    If the video visit is dropped, the invitation should be resent by: Text to cell phone: [phone]    Will anyone else be joining your video visit? No    I    Video-Visit Details    Type of service:  Video Visit    Originating Location (pt. Location): Home    Distant Location (provider location):   Off-Site - Provider Home Office    Platform used for Video Visit: Phone (pt unable to connect to Slide)    Start Time:  3:04pm    End Time: 3:28pm    MEDICAL WEIGHT LOSS FOLLOW UP    Reason for visit: medical weight loss     DIAGNOSIS:  Class III Obesity    ANTHROPOMETRICS:  Initial Weight: 292.8 pounds  Previous Weight:  n/a  Current Weight:  267 lbs 9.6 oz   BMI: Body mass index is 41.91 kg/m .     Weight Loss Medications:   Zepbound (recent transition from Wegovy)    NUTRITION HISTORY:  Breakfast: [wakes at 6-7am, eats at 8am] [place] oatmeal + fruit  yogurt + granola + fruit  Lunch: [12-12:30pm] turkey, chicken or tuna salad " sandwich + fruit   Dinner: [5:30-6pm] chicken or fish + vegetables +  rice  stir damon   Snacks: [morning] 1/2 protein shake  [afternoon] nuts  [evenings] mini ice cream cone  Beverage choices: water (80-120oz), coffee (1-2 cups; milk or creamer), tea (2-3 glasses, herbal), sparkling water (12oz), Fair Life protein shake (daily)  Eating behaviors: stress/boredom/emotional eater - sweets     Dining Out:  How often do you dine out: 1-2x/week  What types of food do you order: Chick hakeem-a       Exercise:  Type: pilates, walking on treadmill  Duration: 30-50 minutes  Frequency: 3-6x/week    Additional Information: Pt recently changed from Wegovy to Zepbound d/t nausea. Feeling better but now struggling with sugar cravings in the afternoon and evening. Discussed adequate protein/fiber at breakfast and lunch as well as adding afternoon snack.  Appropriate question r/t OTC supplement use (creatine, electrolytes, etc)    EVALUATION/PROGRESS TOWARDS GOALS:  Previous Goals:   When craving sweets try-fruit or protein drink or portion controlled low calorie dessert (ex-frozen yogurt bar) - improving  If getting full fast aim to eat 4-5 small meals/day - not needed  Replace refined grains with whole grains - ongoing/improving    Previous Nutrition Diagnosis:  Obese class III related to overeating and poor lifestyle habits as evidence by patient's subjective statements and BMI of 45.86 kg/m2 Based on weight at last visit.   No change, modified below     Current Nutrition Diagnosis:   Obese class III related to excess energy intake and self-monitoring deficit as evidenced by BMI of 41.91 kg/m2.    INTERVENTION:    Nutrition Prescription:  Recommend modified nutrient intake by decreasing energy intake.    Implementation:    Nutrition Education (Content):  Discussed previous goals and determined new goals  Encourage physical activity  Supported patient in attempted weight loss and behavior changes  Patient verbalizes understanding  of diet by stating she will increase protein and fiber at lunch and breakfast  Anticipate good compliance    Goals:  Aim for at least 20g protein at breakfast  Add vegetables to lunch  Have an afternoon snack (examples sent)    Follow Up/Monitoring:  Other  -  patient to follow up in 4-5 months if doing well, or 2-3 months if limited progress    Time Spent With Patient:  24 Minutes       Deanna Pham RD, LD  Clinical Dietitian

## 2025-01-29 ENCOUNTER — LAB (OUTPATIENT)
Dept: LAB | Facility: CLINIC | Age: 35
End: 2025-01-29
Payer: COMMERCIAL

## 2025-01-29 DIAGNOSIS — T88.7XXA MEDICATION SIDE EFFECT: ICD-10-CM

## 2025-01-29 LAB
ALBUMIN SERPL BCG-MCNC: 4.2 G/DL (ref 3.5–5.2)
ALP SERPL-CCNC: 73 U/L (ref 40–150)
ALT SERPL W P-5'-P-CCNC: 19 U/L (ref 0–50)
AST SERPL W P-5'-P-CCNC: 20 U/L (ref 0–45)
BILIRUB DIRECT SERPL-MCNC: <0.2 MG/DL (ref 0–0.3)
BILIRUB SERPL-MCNC: 0.6 MG/DL
PROT SERPL-MCNC: 7.3 G/DL (ref 6.4–8.3)

## 2025-01-29 PROCEDURE — 80076 HEPATIC FUNCTION PANEL: CPT

## 2025-01-29 PROCEDURE — 36415 COLL VENOUS BLD VENIPUNCTURE: CPT

## 2025-03-03 ENCOUNTER — MYC REFILL (OUTPATIENT)
Dept: FAMILY MEDICINE | Facility: CLINIC | Age: 35
End: 2025-03-03
Payer: COMMERCIAL

## 2025-03-03 DIAGNOSIS — B35.1 ONYCHOMYCOSIS: ICD-10-CM

## 2025-03-06 RX ORDER — TERBINAFINE HYDROCHLORIDE 250 MG/1
250 TABLET ORAL DAILY
Qty: 30 TABLET | Refills: 0 | Status: SHIPPED | OUTPATIENT
Start: 2025-03-06

## 2025-03-11 ENCOUNTER — LAB (OUTPATIENT)
Dept: LAB | Facility: CLINIC | Age: 35
End: 2025-03-11
Payer: COMMERCIAL

## 2025-03-11 DIAGNOSIS — T88.7XXA MEDICATION SIDE EFFECT: ICD-10-CM

## 2025-03-11 PROCEDURE — 36415 COLL VENOUS BLD VENIPUNCTURE: CPT

## 2025-03-11 PROCEDURE — 80076 HEPATIC FUNCTION PANEL: CPT

## 2025-03-12 LAB
ALBUMIN SERPL BCG-MCNC: 4.3 G/DL (ref 3.5–5.2)
ALP SERPL-CCNC: 74 U/L (ref 40–150)
ALT SERPL W P-5'-P-CCNC: 21 U/L (ref 0–50)
AST SERPL W P-5'-P-CCNC: 19 U/L (ref 0–45)
BILIRUB DIRECT SERPL-MCNC: 0.12 MG/DL (ref 0–0.3)
BILIRUB SERPL-MCNC: 0.3 MG/DL
PROT SERPL-MCNC: 7.2 G/DL (ref 6.4–8.3)

## 2025-04-16 ENCOUNTER — OFFICE VISIT (OUTPATIENT)
Dept: SURGERY | Facility: CLINIC | Age: 35
End: 2025-04-16
Payer: COMMERCIAL

## 2025-04-16 VITALS
DIASTOLIC BLOOD PRESSURE: 84 MMHG | HEIGHT: 67 IN | SYSTOLIC BLOOD PRESSURE: 112 MMHG | OXYGEN SATURATION: 98 % | WEIGHT: 261 LBS | BODY MASS INDEX: 40.97 KG/M2 | HEART RATE: 83 BPM

## 2025-04-16 DIAGNOSIS — E28.2 PCOS (POLYCYSTIC OVARIAN SYNDROME): ICD-10-CM

## 2025-04-16 DIAGNOSIS — K76.0 HEPATIC STEATOSIS: ICD-10-CM

## 2025-04-16 DIAGNOSIS — E66.01 MORBID OBESITY WITH BMI OF 40.0-44.9, ADULT (H): Primary | ICD-10-CM

## 2025-04-16 PROCEDURE — 99214 OFFICE O/P EST MOD 30 MIN: CPT | Performed by: PHYSICIAN ASSISTANT

## 2025-04-16 PROCEDURE — G2211 COMPLEX E/M VISIT ADD ON: HCPCS | Performed by: PHYSICIAN ASSISTANT

## 2025-04-16 PROCEDURE — 3079F DIAST BP 80-89 MM HG: CPT | Performed by: PHYSICIAN ASSISTANT

## 2025-04-16 PROCEDURE — 3074F SYST BP LT 130 MM HG: CPT | Performed by: PHYSICIAN ASSISTANT

## 2025-04-16 NOTE — PROGRESS NOTES
Return Medical Weight Management Note         Bettina Blackman  MRN:  [id number]  :  1990  BRANDY:  2025      Dear Ritika Armstrong MD,    I had the pleasure of seeing your patient Bettina Blackman. She is a 34 year old female who I am continuing to see for treatment of obesity related to:        3/21/2024     4:21 PM   --   I have the following health issues associated with obesity Polycystic Ovarian Syndrome    Fatty Liver   I have the following symptoms associated with obesity Back Pain    Irregular Menstral Cycle       Assessment   Problem List Items Addressed This Visit       PCOS (polycystic ovarian syndrome)    Hepatic steatosis    Relevant Medications    tirzepatide-Weight Management (ZEPBOUND) 12.5 MG/0.5ML prefilled pen    Other Relevant Orders    Basic metabolic panel    Morbid obesity with BMI of 40.0-44.9, adult (H) - Primary    Relevant Medications    tirzepatide-Weight Management (ZEPBOUND) 12.5 MG/0.5ML prefilled pen    Other Relevant Orders    Basic metabolic panel          PLAN/DISCUSSION:  Discussed increasing zepbound. Will message clinic if would like to go to 15 mg before next appointment. Patient is congratulated on lifestyle choices. Does have PCOS which can make weight loss difficult. Discussed bariatric surgery. Will watch online seminar and check with insurance. If desires to move forward will message clinic.     Plan:  Get lab drawn  Increase zepbound to 12.5 mg  Will watch online seminar    FOLLOW-UP:  3-4 months        SUBJECTIVE/OBJECTIVE:  Patient last seen 2025 by Agnes Duffy PA-C and was continued on zepbound. Is on the 10 mg dose for just about 3 months. Initially had some appetite suppression at this dose but does not feel this anymore. Tolerating well. No nausea like when she took zepbound    Feels like stress level is low.  Snacking is less. This could also be because patient is now having a Fairlife protein drink during the day.    Has Aura ring that  monitors sleep, activity, stress levels and menstrual cycles. Things are going well.       Anti-obesity medications:   Current: zepbound 10 mg on Wednesdays  Side effects:  Denies nausea, vomiting, abdominal pain, severe constipation, diarrhea, or heartburn      Antiobesity medication history:  Phentermine (Lomaira, Adipex)    Phentermine/Topiramate (Qsymia)    Bupropion/Naltrexone (Contrave)    Liraglutide (Saxenda)    Semaglutide (Wegovy) Weight loss stalled. Had nausea   Tirzepatide (Zepbound) Currently taking   Orlistat (Xenical/Dong)    Off-Label Medications for Obesity  Semaglutide (Ozempic)    Tirzepatide (Mounjaro)    Bupropion (Wellbutrin)    Metformin(Glucophage)    Topiramate (Topamax)    Naltexone              4/14/2025    10:38 PM   Weight Loss Medication History Reviewed With Patient   Are you having any side effects from the weight loss medication that we have prescribed you? No       Recent diet changes: Drinks 100-120 oz water daily, tea in the afternoon.   B: 2 eggs with berries and sour dough  Part of [employer] protein drink  L: Turkey chili  Part of [employer] protein drink  D: Salad with grilled chicken  Snacks - minimal     Recent exercise/activity changes: Increased pilates to 4 times weekly. Also working on treadmill for 30 minutes    Recent stressors: ok      CURRENT WEIGHT:   261 lbs 0 oz    Initial Weight (lbs): 291 lbs  Last Visits Weight: 267 lb (121.1 kg)  Cumulative weight loss (lbs): 30  Weight Loss Percentage: 10.31%        4/14/2025    10:38 PM   Changes and Difficulties   I have made the following changes to my diet since my last visit: More protein earlier in the day   With regards to my diet, I am still struggling with: Occasional cravings in the afternoon, food prep   I have made the following changes to my activity/exercise since my last visit: [employer] treadmill walking at an incline   With regards to my activity/exercise, I am still struggling with: Motivation when weight  "has plateaued         MEDICATIONS:   Current Outpatient Medications   Medication Sig Dispense Refill    tirzepatide-Weight Management (ZEPBOUND) 10 MG/0.5ML prefilled pen Inject 0.5 mLs (10 mg) subcutaneously every 7 days. 2 mL 3    tirzepatide-Weight Management (ZEPBOUND) 12.5 MG/0.5ML prefilled pen Inject 0.5 mLs (12.5 mg) subcutaneously every 7 days. 2 mL 1    terbinafine (LAMISIL) 250 MG tablet Take 1 tablet (250 mg) by mouth daily. 30 tablet 0    tirzepatide-Weight Management (ZEPBOUND) 7.5 MG/0.5ML prefilled pen Inject 0.5 mLs (7.5 mg) subcutaneously every 7 days. 2 mL 0           PHYSICAL EXAM:  Objective    /84   Pulse 83   Ht 5' 7\" (1.702 m)   Wt 261 lb (118.4 kg)   SpO2 98%   BMI 40.88 kg/m    GENERAL: Healthy, alert and no distress  EYES: Eyes grossly normal to inspection.  No discharge or erythema, or obvious scleral/conjunctival abnormalities.  RESP: No audible wheeze, cough, or visible cyanosis.  No visible retractions or increased work of breathing.    SKIN: Visible skin clear. No significant rash, abnormal pigmentation or lesions.  NEURO: Cranial nerves grossly intact.  Mentation and speech appropriate for age.  PSYCH: Mentation appears normal, affect normal/bright, judgement and insight intact, normal speech and appearance well-groomed.        Sincerely,    Kimberli Beckwith PA-C    28 minutes spent on the date of the encounter doing chart review, review of test results, patient visit and documentation     "

## 2025-04-16 NOTE — PATIENT INSTRUCTIONS
"Nice to talk with you today! Thank you for allowing me the privilege of caring for you.   We hope we provided you with the excellent service you deserve.     To ensure the quality of our services you may receive a patient satisfaction survey from an independent monitoring company.  The greatest compliment you can give is \"Likely to Recommend\"      Below is our plan we discussed.-  SHAKA Ag      Get lab drawn  Increase zepbound to 12.5 mg  Will watch online seminar    Scheduled for a follow up with Kimberli Beckwith PA-C in 3 months.  If you need to reach me sooner you can do so by calling [phone].    Have a great day!       Please view our Weight Loss Surgery Seminar at the following website:     [email or web address]       Eat Better ? Move More ? Live Well    Eat 3 nutrient-rich meals each day    Don t skip meals--it will cause you to overeat later in the day!    Eating fiber (vegetables/fruits/whole grains) and protein with meals helps you stay full longer    Choose foods with less than 10 grams of sugar and 5 grams of fat per serving to prevent excess calories and weight gain  Eat around the same times each day to develop a routine eating schedule   Avoid snacking unless physically hungry.   Planned snacks: 1-2 times per day and no more than 150 calories    Eat protein first   Protein helps with healing, maintaining adequate muscle mass, reducing hunger and optimizing nutritional status   Aim for 60-80 grams of protein per day   Fill up on Fiber   Fiber comes from plants--fruits, veggies, whole grains, nuts/seeds and beans   Fiber is low in calories, high in phytonutrients and helps you stay full longer   Aim for 25-35 grams per day by eating fiber with meals and snacks  Eat S-[id number]   Take 20-30 minutes to eat each meal by taking small bites, chewing foods to applesauce consistency or 20-30 times before you swallow   Eating foods too fast can delay " satiety/fullness signals and increase overeating   Slow down your eating by using toddler utensils, putting your fork/spoon down between bites and not watching TV or emailing during meals!   Keep a Journal         Writing down what you eat, how you feel and when you are active helps you identify new changes to work on from week to week         Look for ways to cut 100 calories from your current diet 2-3 times per day  Drink 64 ounces of 0-Calorie drinks between meals   Water   Zero calorie Propel  or Vitamin Water     So[employer]  Zero Calories   Crystal Light , Sugar-Free Jimmy-Aid , and other sugar-free lemonade or flavored vincent   Keep Caffeine to less than 300mg per day ie: 3-6oz cups coffee     Work up to 45-60 minutes of physical activity most days of the week   Helps with losing weight and prevent regaining those extra pounds!    Do a combo of cardio (walking/water exercises) and strength training (lifting weights/Vinyasa yoga)    Avoid Mindless Eating   Be present when you eat--take note of the smell, taste and quality of your food   Make a list of alternative activities you could do to prevent eating out of boredom/stress  Go for a walk, call a friend, chew gum, paint your nails, re-organize the garage, etc

## 2025-06-04 ENCOUNTER — MYC REFILL (OUTPATIENT)
Dept: SURGERY | Facility: CLINIC | Age: 35
End: 2025-06-04
Payer: COMMERCIAL

## 2025-06-04 DIAGNOSIS — E66.01 MORBID OBESITY WITH BMI OF 40.0-44.9, ADULT (H): ICD-10-CM

## 2025-06-04 DIAGNOSIS — K76.0 HEPATIC STEATOSIS: ICD-10-CM

## 2025-06-10 ENCOUNTER — PATIENT OUTREACH (OUTPATIENT)
Dept: CARE COORDINATION | Facility: CLINIC | Age: 35
End: 2025-06-10
Payer: COMMERCIAL

## 2025-07-08 DIAGNOSIS — E66.01 MORBID OBESITY WITH BMI OF 40.0-44.9, ADULT (H): Primary | ICD-10-CM

## 2025-07-16 ENCOUNTER — OFFICE VISIT (OUTPATIENT)
Dept: SURGERY | Facility: CLINIC | Age: 35
End: 2025-07-16
Payer: COMMERCIAL

## 2025-07-16 VITALS
DIASTOLIC BLOOD PRESSURE: 77 MMHG | BODY MASS INDEX: 40.93 KG/M2 | HEART RATE: 73 BPM | HEIGHT: 67 IN | SYSTOLIC BLOOD PRESSURE: 116 MMHG | WEIGHT: 260.8 LBS | OXYGEN SATURATION: 98 %

## 2025-07-16 DIAGNOSIS — Z13.0 SCREENING FOR IRON DEFICIENCY ANEMIA: ICD-10-CM

## 2025-07-16 DIAGNOSIS — E28.2 PCOS (POLYCYSTIC OVARIAN SYNDROME): ICD-10-CM

## 2025-07-16 DIAGNOSIS — Z13.29 SCREENING FOR ENDOCRINE, NUTRITIONAL, METABOLIC AND IMMUNITY DISORDER: ICD-10-CM

## 2025-07-16 DIAGNOSIS — Z13.228 SCREENING FOR ENDOCRINE, NUTRITIONAL, METABOLIC AND IMMUNITY DISORDER: ICD-10-CM

## 2025-07-16 DIAGNOSIS — E66.01 MORBID OBESITY WITH BMI OF 40.0-44.9, ADULT (H): Primary | ICD-10-CM

## 2025-07-16 DIAGNOSIS — Z13.0 SCREENING FOR ENDOCRINE, NUTRITIONAL, METABOLIC AND IMMUNITY DISORDER: ICD-10-CM

## 2025-07-16 DIAGNOSIS — Z13.21 SCREENING FOR ENDOCRINE, NUTRITIONAL, METABOLIC AND IMMUNITY DISORDER: ICD-10-CM

## 2025-07-16 PROCEDURE — 3074F SYST BP LT 130 MM HG: CPT | Performed by: PHYSICIAN ASSISTANT

## 2025-07-16 PROCEDURE — G2211 COMPLEX E/M VISIT ADD ON: HCPCS | Performed by: PHYSICIAN ASSISTANT

## 2025-07-16 PROCEDURE — 99214 OFFICE O/P EST MOD 30 MIN: CPT | Performed by: PHYSICIAN ASSISTANT

## 2025-07-16 PROCEDURE — 3078F DIAST BP <80 MM HG: CPT | Performed by: PHYSICIAN ASSISTANT

## 2025-07-16 NOTE — PATIENT INSTRUCTIONS
"Nice to talk with you today! Thank you for allowing me the privilege of caring for you.   We hope we provided you with the excellent service you deserve.     To ensure the quality of our services you may receive a patient satisfaction survey from an independent monitoring company.  The greatest compliment you can give is \"Likely to Recommend\"      Below is our plan we discussed.-  SHAKA Ag      Preop labs  Psychological evaluation   3.  Schedule with dietitian   4.  Check with insurance  5.  Weight maintenance    Please  schedule a PreOp surgical evaluation with Kimberli Beckwith PA-C in 3 months.  If you need to reach me sooner you can do so by calling [phone].    Have a great day!         "
declines

## 2025-07-16 NOTE — PROGRESS NOTES
Return Medical Weight Management Note         Bettina Blackman  MRN:  [id number]  :  1990  BRANDY:  2025      Dear Ritika Armstrong MD,    I had the pleasure of seeing your patient Bettina Blackman. She is a 35 year old female who I am continuing to see for treatment of obesity related to:        3/21/2024     4:21 PM   --   I have the following health issues associated with obesity Polycystic Ovarian Syndrome    Fatty Liver   I have the following symptoms associated with obesity Back Pain    Irregular Menstral Cycle         Assessment   Problem List Items Addressed This Visit       PCOS (polycystic ovarian syndrome)    Morbid obesity with BMI of 40.0-44.9, adult (H) - Primary    Relevant Orders    NUTRITION REFERRAL    Adult Mental Health  Referral     Other Visit Diagnoses         Screening for iron deficiency anemia        Relevant Orders    CBC with platelets      Screening for endocrine, nutritional, metabolic and immunity disorder        Relevant Orders    Comprehensive metabolic panel    Hemoglobin A1c    Vitamin D Deficiency             PLAN/DISCUSSION:  Discussed the impact of PCOS on weight loss. Patient will continue with zepbound 15 mg. Unfortunately has not had as much weight loss as desired in spite of making significant lifestyle changes.    Spent majority of the visit talking about bariatric surgery. Patient attended the support group and watched the online seminar. Has not reached out to insurance yet regarding coverage but will do so today.     Set up a tentative task list.   Preop labs  Psychological evaluation   3.  Schedule with dietitian   4.  Check with insurance  5.   Weight maintenance       FOLLOW-UP:  one month with provider for 60 minute PreOp bariatric surgical appointment. Set up with dietitian visits as well.         SUBJECTIVE/OBJECTIVE:  Patient last seen 2025 and was continued on zepbound. Took first dose of the zepbound 15 mg last week. Did not notice  any increase in appetite suppression. Tolerating well.     Father was diagnosed with stage 3 cancer last month. Patient has had more stress  He will be having surgery soon. Will be going to [place] for 2 weeks for work.     Is interested in learning more about bariatric surgery.     Anti-obesity medications:   Current: zepbound 10 mg on Wednesdays  Side effects:  Denies nausea, vomiting, abdominal pain, severe constipation, diarrhea, or heartburn       Antiobesity medication history:  Phentermine (Lomaira, Adipex)  made jittery   Phentermine/Topiramate (Qsymia)     Bupropion/Naltrexone (Contrave)     Liraglutide (Saxenda)     Semaglutide (Wegovy) Weight loss stalled. Had nausea   Tirzepatide (Zepbound) Currently taking   Orlistat (Xenical/Dong)     Off-Label Medications for Obesity  Semaglutide (Ozempic)     Tirzepatide (Mounjaro)     Bupropion (Wellbutrin)     Metformin(Glucophage)     Topiramate (Topamax)     Naltexone            7/16/2025     5:53 AM   Weight Loss Medication History Reviewed With Patient   Are you having any side effects from the weight loss medication that we have prescribed you? No       Recent diet changes: Drinking 100-120 oz water daily  Oatmeal bake  Salad with chicken and a plum  Stir damon with shrimp  Does not snack     Recent stressors: higher with fathers cancer diagnosis      CURRENT WEIGHT:   260 lbs 12.8 oz    Initial Weight (lbs): 291 lbs  Last Visits Weight: 261 lb (118.4 kg)  Cumulative weight loss (lbs): 30.2  Weight Loss Percentage: 10.38%        7/16/2025     5:53 AM   Changes and Difficulties   I have made the following changes to my diet since my last visit: More fruits   I have made the following changes to my activity/exercise since my last visit: More outside exercise with the nicer weather         MEDICATIONS:   Current Outpatient Medications   Medication Sig Dispense Refill    tirzepatide-Weight Management (ZEPBOUND) 15 MG/0.5ML prefilled pen Inject 0.5 mLs (15 mg)  "subcutaneously every 7 days. 2 mL 1         ROS:  General  Fatigue: No  Sleep Quality: OK      PHYSICAL EXAM:  Objective    /77   Pulse 73   Ht 5' 7\" (1.702 m)   Wt 260 lb 12.8 oz (118.3 kg)   SpO2 98%   BMI 40.85 kg/m    GENERAL: Healthy, alert and no distress  EYES: Eyes grossly normal to inspection.  No discharge or erythema, or obvious scleral/conjunctival abnormalities.  RESP: No audible wheeze, cough, or visible cyanosis.  No visible retractions or increased work of breathing.    SKIN: Visible skin clear. No significant rash, abnormal pigmentation or lesions.  NEURO: Cranial nerves grossly intact.  Mentation and speech appropriate for age.  PSYCH: Mentation appears normal, affect normal/bright, judgement and insight intact, normal speech and appearance well-groomed.        Sincerely,    Kimberli Beckwith PA-C    30 minutes spent on the date of the encounter doing chart review, review of test results, patient visit and documentation     "

## 2025-07-17 ENCOUNTER — PATIENT OUTREACH (OUTPATIENT)
Dept: CARE COORDINATION | Facility: CLINIC | Age: 35
End: 2025-07-17

## 2025-07-17 ENCOUNTER — VIRTUAL VISIT (OUTPATIENT)
Dept: SURGERY | Facility: CLINIC | Age: 35
End: 2025-07-17
Payer: COMMERCIAL

## 2025-07-17 DIAGNOSIS — E66.01 MORBID OBESITY WITH BMI OF 40.0-44.9, ADULT (H): ICD-10-CM

## 2025-07-17 NOTE — PROGRESS NOTES
"Bettina is a 35 year old who is being evaluated via a billable video visit.      The patient has been notified of following:     \"This video visit will be conducted via a call between you and your physician/provider. We have found that certain health care needs can be provided without the need for an in-person physical exam.  This service lets us provide the care you need with a video conversation.  If a prescription is necessary we can send it directly to your pharmacy.  If lab work is needed we can place an order for that and you can then stop by our lab to have the test done at a later time.    Video visits are billed at different rates depending on your insurance coverage.  Please reach out to your insurance provider with any questions.    If during the course of the call the physician/provider feels a video visit is not appropriate, you will not be charged for this service.\"    Patient has given verbal consent for Video visit? Yes    How would you like to obtain your AVS? MyChart    If the video visit is dropped, the invitation should be resent by: Text to cell phone: [phone]    Will anyone else be joining your video visit? No    I    Video-Visit Details    Type of service:  Video Visit    Originating Location (pt. Location): Home    Distant Location (provider location):   [facility]    Platform used for Video Visit: [employer]    Video Start Time: 10:03 AM    Video End Time:10:34 AM      New Bariatric Nutrition Consultation Note    Reason For Visit: Nutrition Assessment    Bettina Blackman is a 35 year old presenting today for new bariatric nutrition consult.  Pt is interested in laparoscopic gastric bypass.  Patient is accompanied by self.    ANTHROPOMETRICS:  Estimated body mass index is 40.85 kg/m  as calculated from the following:    Height as of 7/16/25: 1.702 m (5' 7\").    Weight as of 7/16/25: 118.3 kg (260 lb 12.8 oz).    Weight Loss goal not provided due to " video visit       SUPPLEMENT INFORMATION:  MVI  Vitamin D     NUTRITION HISTORY:  Breakfast: [wakes at 6-7am, eats at 8am] [place] oatmeal + fruit  yogurt + granola + fruit  Snack: protein shake   Lunch: [12-12:30pm] turkey, chicken or tuna salad sandwich + fruit   Dinner: [5:30-6pm] chicken or fish + vegetables +  rice  stir damon   Snacks: [morning] 1/2 protein shake  [afternoon] nuts  [evenings] mini ice cream cone  Beverage choices: water (80-120oz), coffee (1-2 cups; milk or creamer), herbal tea (2-3 glasses, herbal), sparkling water (12oz), Fair Life protein shake (daily)  Eating behaviors: stress/boredom/emotional eater - sweets     ADDITIONAL INFORMATION:  Patient feels her biggest challenge with food is emotional eating. Patient has been working on self care to replace emotional eating-skin care- mask; massgae; paint nails or mask; cross stick or makes jewlery   Patient exploring surgery route for weight loss. Patient finds if she eats breakfast she does not have sugar cravings in the afternoon.     NUTRITION DIAGNOSIS:  Obesity r/t long history of self-monitoring deficit and excessive energy intake aeb BMI >30 kg/m2.    INTERVENTION:  Intervention Provided/Education Provided on post-op diet guidelines, vitamins/minerals essential post-operatively, GI anatomy of bariatric surgeries, ways to help prepare for post-op diet guidelines pre-operatively, portion/calorie-control, mindful eating.  Provided pt with list of goals RD contact information.          3/21/2024     4:21 PM   Questions Reviewed With Patient   How ready are you to make changes regarding your weight? Number 1 = Not ready at all to make changes up to 10 = very ready. 10   How confident are you that you can change? 1 = Not confident that you will be successful making changes up to 10 = very confident. 9       Patient Understanding: good  Expected Patient Engagement: good    GOALS:  Chew foods to applesauce texture   Practice avoiding liquids 30  minutes before, during and after meals  Start 600 mg calcium 2 times per day     Follow-Up:   Recommend 2-3 follow up visits to assist with lifestyle changes or per insurance.    Time spent with patient: 33 minutes    Carolynn Payan, RD, LD  [facility] [place]   [phone] (office)

## 2025-07-17 NOTE — PATIENT INSTRUCTIONS
Mark Dunbar,    It was nice meeting you today. Below are the goals we discussed for pre-surgery route:     Chew foods to applesauce texture   Practice avoiding liquids 30 minutes before, during and after meals  Start 600 mg calcium 2 times per day -take 2 hours apart from MVI  Continue MVI and vitamin D     These are the resources that I mentioned regarding diet changes and post surgery diet progression:    Diet Guidelines after Weight-Loss Surgery    Post-op Diet Stage Progression:    Your Stage 1 Diet: Clear Liquids     Your Stage 2 Diet: Low-fat Full Liquids     Your Stage 3 Diet: Pureed Foods     Your Stage 4 Diet: Soft Foods    Your Stage 5 Diet: Regular Foods    Supplements:    Supplements after Sleeve Gastrectomy, Gastric Bypass or Single Anastomosis Duodenal Switch Surgery    Please let me know if you have any questions.    Have a great month!    Carolynn Kennedy, RD, LD  [facility] [place]   [phone] (office)

## 2025-07-19 ENCOUNTER — LAB (OUTPATIENT)
Dept: LAB | Facility: CLINIC | Age: 35
End: 2025-07-19
Payer: COMMERCIAL

## 2025-07-19 DIAGNOSIS — Z13.228 SCREENING FOR ENDOCRINE, NUTRITIONAL, METABOLIC AND IMMUNITY DISORDER: ICD-10-CM

## 2025-07-19 DIAGNOSIS — Z13.29 SCREENING FOR ENDOCRINE, NUTRITIONAL, METABOLIC AND IMMUNITY DISORDER: ICD-10-CM

## 2025-07-19 DIAGNOSIS — Z13.21 SCREENING FOR ENDOCRINE, NUTRITIONAL, METABOLIC AND IMMUNITY DISORDER: ICD-10-CM

## 2025-07-19 DIAGNOSIS — Z13.0 SCREENING FOR ENDOCRINE, NUTRITIONAL, METABOLIC AND IMMUNITY DISORDER: ICD-10-CM

## 2025-07-19 DIAGNOSIS — Z13.0 SCREENING FOR IRON DEFICIENCY ANEMIA: ICD-10-CM

## 2025-07-19 LAB
ALBUMIN SERPL BCG-MCNC: 4.1 G/DL (ref 3.5–5.2)
ALP SERPL-CCNC: 80 U/L (ref 40–150)
ALT SERPL W P-5'-P-CCNC: 14 U/L (ref 0–50)
ANION GAP SERPL CALCULATED.3IONS-SCNC: 10 MMOL/L (ref 7–15)
AST SERPL W P-5'-P-CCNC: 21 U/L (ref 0–45)
BILIRUB SERPL-MCNC: 0.6 MG/DL
BUN SERPL-MCNC: 15.9 MG/DL (ref 6–20)
CALCIUM SERPL-MCNC: 9.3 MG/DL (ref 8.8–10.4)
CHLORIDE SERPL-SCNC: 105 MMOL/L (ref 98–107)
CREAT SERPL-MCNC: 0.9 MG/DL (ref 0.51–0.95)
EGFRCR SERPLBLD CKD-EPI 2021: 85 ML/MIN/1.73M2
ERYTHROCYTE [DISTWIDTH] IN BLOOD BY AUTOMATED COUNT: 11.8 % (ref 10–15)
EST. AVERAGE GLUCOSE BLD GHB EST-MCNC: 108 MG/DL
GLUCOSE SERPL-MCNC: 76 MG/DL (ref 70–99)
HBA1C MFR BLD: 5.4 % (ref 0–5.6)
HCO3 SERPL-SCNC: 22 MMOL/L (ref 22–29)
HCT VFR BLD AUTO: 34.5 % (ref 35–47)
HGB BLD-MCNC: 12.1 G/DL (ref 11.7–15.7)
MCH RBC QN AUTO: 29.1 PG (ref 26.5–33)
MCHC RBC AUTO-ENTMCNC: 35.1 G/DL (ref 31.5–36.5)
MCV RBC AUTO: 83 FL (ref 78–100)
PLATELET # BLD AUTO: 266 10E3/UL (ref 150–450)
POTASSIUM SERPL-SCNC: 4.4 MMOL/L (ref 3.4–5.3)
PROT SERPL-MCNC: 7.1 G/DL (ref 6.4–8.3)
RBC # BLD AUTO: 4.16 10E6/UL (ref 3.8–5.2)
SODIUM SERPL-SCNC: 137 MMOL/L (ref 135–145)
VIT D+METAB SERPL-MCNC: 47 NG/ML (ref 20–50)
WBC # BLD AUTO: 6.7 10E3/UL (ref 4–11)

## 2025-07-19 PROCEDURE — 36415 COLL VENOUS BLD VENIPUNCTURE: CPT

## 2025-07-19 PROCEDURE — 80053 COMPREHEN METABOLIC PANEL: CPT

## 2025-07-19 PROCEDURE — 82306 VITAMIN D 25 HYDROXY: CPT

## 2025-07-19 PROCEDURE — 85027 COMPLETE CBC AUTOMATED: CPT

## 2025-07-19 PROCEDURE — 83036 HEMOGLOBIN GLYCOSYLATED A1C: CPT

## 2025-07-21 ENCOUNTER — PATIENT OUTREACH (OUTPATIENT)
Dept: CARE COORDINATION | Facility: CLINIC | Age: 35
End: 2025-07-21
Payer: COMMERCIAL

## 2025-08-13 ASSESSMENT — SLEEP AND FATIGUE QUESTIONNAIRES
HOW LIKELY ARE YOU TO NOD OFF OR FALL ASLEEP IN A CAR, WHILE STOPPED FOR A FEW MINUTES IN TRAFFIC: WOULD NEVER DOZE
HOW LIKELY ARE YOU TO NOD OFF OR FALL ASLEEP WHILE SITTING INACTIVE IN A PUBLIC PLACE: WOULD NEVER DOZE
HOW LIKELY ARE YOU TO NOD OFF OR FALL ASLEEP WHILE LYING DOWN TO REST IN THE AFTERNOON WHEN CIRCUMSTANCES PERMIT: SLIGHT CHANCE OF DOZING
HOW LIKELY ARE YOU TO NOD OFF OR FALL ASLEEP WHILE SITTING QUIETLY AFTER LUNCH WITHOUT ALCOHOL: WOULD NEVER DOZE
HOW LIKELY ARE YOU TO NOD OFF OR FALL ASLEEP WHEN YOU ARE A PASSENGER IN A CAR FOR AN HOUR WITHOUT A BREAK: MODERATE CHANCE OF DOZING
HOW LIKELY ARE YOU TO NOD OFF OR FALL ASLEEP WHILE SITTING AND READING: SLIGHT CHANCE OF DOZING
HOW LIKELY ARE YOU TO NOD OFF OR FALL ASLEEP WHILE WATCHING TV: SLIGHT CHANCE OF DOZING
HOW LIKELY ARE YOU TO NOD OFF OR FALL ASLEEP WHILE SITTING AND TALKING TO SOMEONE: WOULD NEVER DOZE

## 2025-08-14 ENCOUNTER — VIRTUAL VISIT (OUTPATIENT)
Dept: SURGERY | Facility: CLINIC | Age: 35
End: 2025-08-14
Payer: COMMERCIAL

## 2025-08-14 VITALS — WEIGHT: 259 LBS | BODY MASS INDEX: 40.57 KG/M2

## 2025-08-14 DIAGNOSIS — E66.01 MORBID OBESITY WITH BMI OF 40.0-44.9, ADULT (H): Primary | ICD-10-CM

## 2025-08-20 ENCOUNTER — OFFICE VISIT (OUTPATIENT)
Dept: SURGERY | Facility: CLINIC | Age: 35
End: 2025-08-20
Payer: COMMERCIAL

## 2025-08-20 VITALS
WEIGHT: 257.1 LBS | SYSTOLIC BLOOD PRESSURE: 114 MMHG | HEIGHT: 67 IN | BODY MASS INDEX: 40.35 KG/M2 | HEART RATE: 67 BPM | DIASTOLIC BLOOD PRESSURE: 76 MMHG | OXYGEN SATURATION: 98 %

## 2025-08-20 DIAGNOSIS — E66.01 MORBID OBESITY WITH BMI OF 40.0-44.9, ADULT (H): Primary | ICD-10-CM

## 2025-08-20 PROCEDURE — 3078F DIAST BP <80 MM HG: CPT | Performed by: PHYSICIAN ASSISTANT

## 2025-08-20 PROCEDURE — 99215 OFFICE O/P EST HI 40 MIN: CPT | Performed by: PHYSICIAN ASSISTANT

## 2025-08-20 PROCEDURE — 3074F SYST BP LT 130 MM HG: CPT | Performed by: PHYSICIAN ASSISTANT

## 2025-08-20 PROCEDURE — G2211 COMPLEX E/M VISIT ADD ON: HCPCS | Performed by: PHYSICIAN ASSISTANT
